# Patient Record
Sex: MALE | Race: BLACK OR AFRICAN AMERICAN | NOT HISPANIC OR LATINO | Employment: UNEMPLOYED | URBAN - METROPOLITAN AREA
[De-identification: names, ages, dates, MRNs, and addresses within clinical notes are randomized per-mention and may not be internally consistent; named-entity substitution may affect disease eponyms.]

---

## 2023-02-25 PROBLEM — R07.89 ATYPICAL CHEST PAIN: Status: ACTIVE | Noted: 2023-02-25

## 2023-02-25 PROBLEM — I50.9 CHF (CONGESTIVE HEART FAILURE) (MULTI): Status: ACTIVE | Noted: 2023-02-25

## 2023-02-25 PROBLEM — N13.8 BPH WITH OBSTRUCTION/LOWER URINARY TRACT SYMPTOMS: Status: ACTIVE | Noted: 2023-02-25

## 2023-02-25 PROBLEM — N40.1 BPH WITH OBSTRUCTION/LOWER URINARY TRACT SYMPTOMS: Status: ACTIVE | Noted: 2023-02-25

## 2023-02-25 PROBLEM — I51.89 LEFT VENTRICULAR SYSTOLIC DYSFUNCTION, NYHA CLASS 3: Status: ACTIVE | Noted: 2023-02-25

## 2023-02-25 PROBLEM — G89.29 CHRONIC RIGHT-SIDED LOW BACK PAIN WITH SCIATICA: Status: ACTIVE | Noted: 2023-02-25

## 2023-02-25 PROBLEM — E11.3293 TYPE 2 DIABETES MELLITUS WITH MILD NONPROLIFERATIVE DIABETIC RETINOPATHY WITHOUT MACULAR EDEMA, BILATERAL (MULTI): Status: ACTIVE | Noted: 2023-02-25

## 2023-02-25 PROBLEM — E55.9 VITAMIN D DEFICIENCY: Status: ACTIVE | Noted: 2023-02-25

## 2023-02-25 PROBLEM — H10.9 CONJUNCTIVITIS, LEFT EYE: Status: ACTIVE | Noted: 2023-02-25

## 2023-02-25 PROBLEM — D64.9 ANEMIA: Status: ACTIVE | Noted: 2023-02-25

## 2023-02-25 PROBLEM — M54.40 CHRONIC RIGHT-SIDED LOW BACK PAIN WITH SCIATICA: Status: ACTIVE | Noted: 2023-02-25

## 2023-02-25 PROBLEM — R06.00 DYSPNEA: Status: ACTIVE | Noted: 2023-02-25

## 2023-02-25 PROBLEM — H25.812 COMBINED FORM OF AGE-RELATED CATARACT, LEFT EYE: Status: ACTIVE | Noted: 2023-02-25

## 2023-02-25 PROBLEM — I20.9 ANGINA PECTORIS (CMS-HCC): Status: ACTIVE | Noted: 2023-02-25

## 2023-02-25 PROBLEM — I25.10 ARTERIOSCLEROTIC CARDIOVASCULAR DISEASE: Status: ACTIVE | Noted: 2023-02-25

## 2023-02-25 PROBLEM — E11.43 DIABETIC GASTROPARESIS (MULTI): Status: ACTIVE | Noted: 2023-02-25

## 2023-02-25 PROBLEM — R60.0 PEDAL EDEMA: Status: ACTIVE | Noted: 2023-02-25

## 2023-02-25 PROBLEM — K59.00 CONSTIPATION: Status: ACTIVE | Noted: 2023-02-25

## 2023-02-25 PROBLEM — J45.909 ASTHMA (HHS-HCC): Status: ACTIVE | Noted: 2023-02-25

## 2023-02-25 PROBLEM — R29.818 SUSPECTED SLEEP APNEA: Status: ACTIVE | Noted: 2023-02-25

## 2023-02-25 PROBLEM — N20.0 NEPHROLITHIASIS: Status: ACTIVE | Noted: 2023-02-25

## 2023-02-25 PROBLEM — R22.32 LOCALIZED SWELLING ON LEFT HAND: Status: ACTIVE | Noted: 2023-02-25

## 2023-02-25 PROBLEM — H40.003 GLAUCOMA SUSPECT OF BOTH EYES: Status: ACTIVE | Noted: 2023-02-25

## 2023-02-25 PROBLEM — R11.0 NAUSEA IN ADULT: Status: ACTIVE | Noted: 2023-02-25

## 2023-02-25 PROBLEM — E66.01 MORBID OBESITY (MULTI): Status: ACTIVE | Noted: 2023-02-25

## 2023-02-25 PROBLEM — E11.3299 BDR (BACKGROUND DIABETIC RETINOPATHY) (MULTI): Status: ACTIVE | Noted: 2023-02-25

## 2023-02-25 PROBLEM — H35.033 BILATERAL HYPERTENSIVE RETINOPATHY: Status: ACTIVE | Noted: 2023-02-25

## 2023-02-25 PROBLEM — N39.3 STRESS INCONTINENCE, MALE: Status: ACTIVE | Noted: 2023-02-25

## 2023-02-25 PROBLEM — M19.042 LOCALIZED OSTEOARTHRITIS OF LEFT HAND: Status: ACTIVE | Noted: 2023-02-25

## 2023-02-25 PROBLEM — K04.7 TOOTH INFECTION: Status: ACTIVE | Noted: 2023-02-25

## 2023-02-25 PROBLEM — H04.123 DRY EYES, BILATERAL: Status: ACTIVE | Noted: 2023-02-25

## 2023-02-25 PROBLEM — J30.9 ALLERGIC RHINITIS: Status: ACTIVE | Noted: 2023-02-25

## 2023-02-25 PROBLEM — E78.5 HYPERLIPIDEMIA: Status: ACTIVE | Noted: 2023-02-25

## 2023-02-25 PROBLEM — H35.383: Status: ACTIVE | Noted: 2023-02-25

## 2023-02-25 PROBLEM — I73.9 INTERMITTENT CLAUDICATION (CMS-HCC): Status: ACTIVE | Noted: 2023-02-25

## 2023-02-25 PROBLEM — K12.1 STOMATITIS: Status: ACTIVE | Noted: 2023-02-25

## 2023-02-25 PROBLEM — L30.9 DERMATITIS, ECZEMATOID: Status: ACTIVE | Noted: 2023-02-25

## 2023-02-25 PROBLEM — H25.811 COMBINED FORM OF AGE-RELATED CATARACT, RIGHT EYE: Status: ACTIVE | Noted: 2023-02-25

## 2023-02-25 PROBLEM — R05.9 COUGH: Status: ACTIVE | Noted: 2023-02-25

## 2023-02-25 PROBLEM — N39.46 URGE AND STRESS INCONTINENCE: Status: ACTIVE | Noted: 2023-02-25

## 2023-02-25 PROBLEM — H40.009 BORDERLINE GLAUCOMA (GLAUCOMA SUSPECT): Status: ACTIVE | Noted: 2023-02-25

## 2023-02-25 PROBLEM — I10 BENIGN ESSENTIAL HYPERTENSION: Status: ACTIVE | Noted: 2023-02-25

## 2023-02-25 PROBLEM — M65.942 TENOSYNOVITIS OF LEFT HAND: Status: ACTIVE | Noted: 2023-02-25

## 2023-02-25 PROBLEM — E83.51 HYPOCALCEMIA: Status: ACTIVE | Noted: 2023-02-25

## 2023-02-25 PROBLEM — R35.0 URINARY FREQUENCY: Status: ACTIVE | Noted: 2023-02-25

## 2023-02-25 PROBLEM — G62.9 NEUROPATHY: Status: ACTIVE | Noted: 2023-02-25

## 2023-02-25 PROBLEM — R94.5 ABNORMAL LIVER FUNCTION: Status: ACTIVE | Noted: 2023-02-25

## 2023-02-25 PROBLEM — K31.84 DIABETIC GASTROPARESIS (MULTI): Status: ACTIVE | Noted: 2023-02-25

## 2023-02-25 PROBLEM — M65.9 TENOSYNOVITIS OF LEFT HAND: Status: ACTIVE | Noted: 2023-02-25

## 2023-02-25 PROBLEM — M10.9 GOUT: Status: ACTIVE | Noted: 2023-02-25

## 2023-02-25 PROBLEM — M94.0 COSTOCHONDRITIS: Status: ACTIVE | Noted: 2023-02-25

## 2023-02-25 RX ORDER — ASPIRIN 81 MG/1
1 TABLET ORAL DAILY
COMMUNITY
Start: 2014-07-20 | End: 2024-04-11 | Stop reason: SDUPTHER

## 2023-02-25 RX ORDER — SYRINGE-NEEDLE,INSULIN,0.5 ML 27GX1/2"
SYRINGE, EMPTY DISPOSABLE MISCELLANEOUS
COMMUNITY
End: 2024-02-09 | Stop reason: SDUPTHER

## 2023-02-25 RX ORDER — PRAVASTATIN SODIUM 80 MG/1
1 TABLET ORAL DAILY
COMMUNITY
Start: 2020-01-09 | End: 2023-11-21 | Stop reason: SDUPTHER

## 2023-02-25 RX ORDER — BUMETANIDE 2 MG/1
1 TABLET ORAL 2 TIMES DAILY
COMMUNITY
Start: 2022-03-31 | End: 2023-05-10 | Stop reason: ENTERED-IN-ERROR

## 2023-02-25 RX ORDER — AMLODIPINE BESYLATE 10 MG/1
1 TABLET ORAL DAILY
COMMUNITY
Start: 2020-06-08 | End: 2023-11-21 | Stop reason: SDUPTHER

## 2023-02-25 RX ORDER — SERTRALINE HYDROCHLORIDE 25 MG/1
25 TABLET, FILM COATED ORAL DAILY
COMMUNITY
End: 2023-03-24 | Stop reason: SDUPTHER

## 2023-02-25 RX ORDER — INSULIN HUMAN 100 [IU]/ML
70 INJECTION, SUSPENSION SUBCUTANEOUS 2 TIMES DAILY
COMMUNITY
End: 2024-02-20 | Stop reason: SDUPTHER

## 2023-02-25 RX ORDER — POLYETHYLENE GLYCOL 3350 17 G/17G
16 POWDER, FOR SOLUTION ORAL DAILY
COMMUNITY
Start: 2019-07-18 | End: 2024-04-11 | Stop reason: SDUPTHER

## 2023-02-25 RX ORDER — DOCUSATE SODIUM 100 MG/1
1 CAPSULE, LIQUID FILLED ORAL 2 TIMES DAILY PRN
COMMUNITY
Start: 2020-01-10 | End: 2024-04-11 | Stop reason: SDUPTHER

## 2023-02-25 RX ORDER — TAMSULOSIN HYDROCHLORIDE 0.4 MG/1
2 CAPSULE ORAL
COMMUNITY
Start: 2020-10-05 | End: 2023-11-03 | Stop reason: SDUPTHER

## 2023-02-25 RX ORDER — ALBUTEROL SULFATE 0.83 MG/ML
2.5 SOLUTION RESPIRATORY (INHALATION) EVERY 6 HOURS PRN
COMMUNITY
Start: 2019-01-31

## 2023-02-25 RX ORDER — SYRINGE-NEEDLE,INSULIN,0.5 ML 30 G X1/2"
SYRINGE, EMPTY DISPOSABLE MISCELLANEOUS
COMMUNITY
End: 2024-02-16 | Stop reason: SDUPTHER

## 2023-02-25 RX ORDER — METOPROLOL SUCCINATE 50 MG/1
1 TABLET, EXTENDED RELEASE ORAL DAILY
COMMUNITY
Start: 2019-04-16 | End: 2023-11-21 | Stop reason: SDUPTHER

## 2023-02-25 RX ORDER — MONTELUKAST SODIUM 10 MG/1
1 TABLET ORAL DAILY
COMMUNITY
Start: 2019-03-06 | End: 2023-12-22 | Stop reason: SDUPTHER

## 2023-02-25 RX ORDER — METOCLOPRAMIDE 10 MG/1
1 TABLET ORAL 2 TIMES DAILY
COMMUNITY
End: 2023-07-20

## 2023-02-25 RX ORDER — NITROGLYCERIN 0.3 MG/1
1 TABLET SUBLINGUAL
COMMUNITY
Start: 2016-08-19

## 2023-02-25 RX ORDER — SACUBITRIL AND VALSARTAN 49; 51 MG/1; MG/1
1 TABLET, FILM COATED ORAL 2 TIMES DAILY
COMMUNITY
Start: 2018-03-29 | End: 2023-11-21 | Stop reason: SDUPTHER

## 2023-02-25 RX ORDER — ALBUTEROL SULFATE 90 UG/1
1-2 AEROSOL, METERED RESPIRATORY (INHALATION) AS NEEDED
COMMUNITY
Start: 2019-01-31 | End: 2024-03-08 | Stop reason: SDUPTHER

## 2023-02-25 RX ORDER — NAPHAZOLINE HYDROCHLORIDE AND POLYETHYLENE GLYCOL 300 .1; 2 MG/ML; MG/ML
SOLUTION/ DROPS OPHTHALMIC
COMMUNITY
Start: 2014-04-14 | End: 2024-02-20 | Stop reason: WASHOUT

## 2023-03-24 ENCOUNTER — OFFICE VISIT (OUTPATIENT)
Dept: PRIMARY CARE | Facility: CLINIC | Age: 63
End: 2023-03-24
Payer: COMMERCIAL

## 2023-03-24 VITALS
HEART RATE: 67 BPM | DIASTOLIC BLOOD PRESSURE: 56 MMHG | WEIGHT: 315 LBS | BODY MASS INDEX: 48.2 KG/M2 | SYSTOLIC BLOOD PRESSURE: 134 MMHG | OXYGEN SATURATION: 97 %

## 2023-03-24 DIAGNOSIS — I50.9 CONGESTIVE HEART FAILURE, UNSPECIFIED HF CHRONICITY, UNSPECIFIED HEART FAILURE TYPE (MULTI): ICD-10-CM

## 2023-03-24 DIAGNOSIS — G62.9 NEUROPATHY: ICD-10-CM

## 2023-03-24 DIAGNOSIS — F32.1 CURRENT MODERATE EPISODE OF MAJOR DEPRESSIVE DISORDER WITHOUT PRIOR EPISODE (MULTI): Primary | Chronic | ICD-10-CM

## 2023-03-24 DIAGNOSIS — N40.1 BPH WITH OBSTRUCTION/LOWER URINARY TRACT SYMPTOMS: ICD-10-CM

## 2023-03-24 DIAGNOSIS — I10 BENIGN ESSENTIAL HYPERTENSION: ICD-10-CM

## 2023-03-24 DIAGNOSIS — E11.43 DIABETIC GASTROPARESIS (MULTI): ICD-10-CM

## 2023-03-24 DIAGNOSIS — K31.84 DIABETIC GASTROPARESIS (MULTI): ICD-10-CM

## 2023-03-24 DIAGNOSIS — R29.818 SUSPECTED SLEEP APNEA: ICD-10-CM

## 2023-03-24 DIAGNOSIS — N13.8 BPH WITH OBSTRUCTION/LOWER URINARY TRACT SYMPTOMS: ICD-10-CM

## 2023-03-24 DIAGNOSIS — E11.3299 BDR (BACKGROUND DIABETIC RETINOPATHY) (MULTI): ICD-10-CM

## 2023-03-24 DIAGNOSIS — N52.8 OTHER MALE ERECTILE DYSFUNCTION: ICD-10-CM

## 2023-03-24 DIAGNOSIS — R94.5 ABNORMAL LIVER FUNCTION: ICD-10-CM

## 2023-03-24 DIAGNOSIS — Z13.89 SCREENING FOR OBESITY: ICD-10-CM

## 2023-03-24 PROCEDURE — 1036F TOBACCO NON-USER: CPT | Performed by: INTERNAL MEDICINE

## 2023-03-24 PROCEDURE — 3075F SYST BP GE 130 - 139MM HG: CPT | Performed by: INTERNAL MEDICINE

## 2023-03-24 PROCEDURE — 99214 OFFICE O/P EST MOD 30 MIN: CPT | Performed by: INTERNAL MEDICINE

## 2023-03-24 PROCEDURE — 3078F DIAST BP <80 MM HG: CPT | Performed by: INTERNAL MEDICINE

## 2023-03-24 PROCEDURE — 3008F BODY MASS INDEX DOCD: CPT | Performed by: INTERNAL MEDICINE

## 2023-03-24 RX ORDER — FUROSEMIDE 40 MG/1
1 TABLET ORAL DAILY
COMMUNITY
Start: 2019-08-30 | End: 2023-11-21 | Stop reason: SDUPTHER

## 2023-03-24 RX ORDER — TADALAFIL 10 MG/1
10 TABLET ORAL DAILY PRN
Qty: 4 TABLET | Refills: 11 | Status: SHIPPED | OUTPATIENT
Start: 2023-03-24 | End: 2023-03-30 | Stop reason: SDUPTHER

## 2023-03-24 RX ORDER — FLUTICASONE PROPIONATE 50 MCG
SPRAY, SUSPENSION (ML) NASAL
COMMUNITY
Start: 2022-02-28

## 2023-03-24 RX ORDER — SERTRALINE HYDROCHLORIDE 25 MG/1
25 TABLET, FILM COATED ORAL DAILY
Qty: 90 TABLET | Refills: 1 | Status: SHIPPED | OUTPATIENT
Start: 2023-03-24

## 2023-03-24 ASSESSMENT — PATIENT HEALTH QUESTIONNAIRE - PHQ9
1. LITTLE INTEREST OR PLEASURE IN DOING THINGS: SEVERAL DAYS
9. THOUGHTS THAT YOU WOULD BE BETTER OFF DEAD, OR OF HURTING YOURSELF: NOT AT ALL
2. FEELING DOWN, DEPRESSED OR HOPELESS: MORE THAN HALF THE DAYS
8. MOVING OR SPEAKING SO SLOWLY THAT OTHER PEOPLE COULD HAVE NOTICED. OR THE OPPOSITE, BEING SO FIGETY OR RESTLESS THAT YOU HAVE BEEN MOVING AROUND A LOT MORE THAN USUAL: SEVERAL DAYS
10. IF YOU CHECKED OFF ANY PROBLEMS, HOW DIFFICULT HAVE THESE PROBLEMS MADE IT FOR YOU TO DO YOUR WORK, TAKE CARE OF THINGS AT HOME, OR GET ALONG WITH OTHER PEOPLE: EXTREMELY DIFFICULT
6. FEELING BAD ABOUT YOURSELF - OR THAT YOU ARE A FAILURE OR HAVE LET YOURSELF OR YOUR FAMILY DOWN: MORE THAN HALF THE DAYS
7. TROUBLE CONCENTRATING ON THINGS, SUCH AS READING THE NEWSPAPER OR WATCHING TELEVISION: NEARLY EVERY DAY
4. FEELING TIRED OR HAVING LITTLE ENERGY: NEARLY EVERY DAY
SUM OF ALL RESPONSES TO PHQ9 QUESTIONS 1 AND 2: 3
3. TROUBLE FALLING OR STAYING ASLEEP OR SLEEPING TOO MUCH: NOT AT ALL

## 2023-03-24 ASSESSMENT — ENCOUNTER SYMPTOMS
DEPRESSION: 1
DEPRESSED MOOD: 1
ANHEDONIA: 1
SLEEP QUALITY: GOOD
NIGHTTIME AWAKENINGS: OCCASIONAL

## 2023-03-24 NOTE — PROGRESS NOTES
Patient ID: Dallas Ruiz is a 63 y.o. male who presents for Follow-up.    /56   Pulse 67   Wt (!) 170 kg (375 lb 6.4 oz)   SpO2 97%   BMI 48.20 kg/m²     Depression  Visit Type: follow-up  Patient presents with the following symptoms: anhedonia, depressed mood and fatigue.  Patient is not experiencing: suicidal ideas and suicidal planning.  Frequency of symptoms: most days   Severity: moderate   Hours of sleep per night: 6-7.  Sleep quality: good  Nighttime awakenings: occasional  Compliance with medications:  26-50%  Side effects:  Impotence      Subjective     Review of Systems   Psychiatric/Behavioral:  Positive for depression. Negative for suicidal ideas.        Objective     Physical Exam  Vitals and nursing note reviewed.   Constitutional:       Appearance: Normal appearance.   Cardiovascular:      Rate and Rhythm: Normal rate and regular rhythm.      Pulses: Normal pulses.      Heart sounds: Normal heart sounds. No murmur heard.  Pulmonary:      Effort: Pulmonary effort is normal.      Breath sounds: Normal breath sounds.   Neurological:      Mental Status: He is alert.   Psychiatric:         Behavior: Behavior normal.         Thought Content: Thought content normal.         Judgment: Judgment normal.      Comments: Depressed          Lab Results   Component Value Date    WBC 8.0 01/24/2023    HGB 10.9 (L) 01/24/2023    HCT 36.2 (L) 01/24/2023    MCV 73 (L) 01/24/2023     01/24/2023           Problem List Items Addressed This Visit          Nervous    Diabetic gastroparesis (CMS/[facility])    Neuropathy       Circulatory    Benign essential hypertension    CHF (congestive heart failure) (CMS/[facility])    Relevant Medications    tadalafil (Cialis) 10 mg tablet       Digestive    Abnormal liver function       Genitourinary    BPH with obstruction/lower urinary tract symptoms       Endocrine/Metabolic    BDR (background diabetic retinopathy) (CMS/[facility])    Body mass index 45.0-49.9, adult (CMS/[facility])        Other    Suspected sleep apnea    Screening for obesity     Other Visit Diagnoses       Current moderate episode of major depressive disorder without prior episode (CMS/HCC)  (Chronic)   -  Primary    Relevant Medications    sertraline (Zoloft) 25 mg tablet    Other male erectile dysfunction        Relevant Medications    tadalafil (Cialis) 10 mg tablet                He feels doing better with sertraline 25 mg I refilled it for 90 days and 1 refill we will see him in 3 months time  Since he is having erectile dysfunction I put Cialis 10 mg 1 pill as needed  Discussed the adverse effect effect of obesity on physical and mental wellbeing  Including sleep apnea daytime somnolence and fatigue a.m. headache  Risk for stroke fatal arrhythmias and sudden death I told him he needs to work on overweight  Needs to cut back portion size and total calorie intake

## 2023-03-29 ENCOUNTER — TELEPHONE (OUTPATIENT)
Dept: PRIMARY CARE | Facility: CLINIC | Age: 63
End: 2023-03-29
Payer: COMMERCIAL

## 2023-03-29 DIAGNOSIS — N52.8 OTHER MALE ERECTILE DYSFUNCTION: ICD-10-CM

## 2023-03-30 RX ORDER — TADALAFIL 10 MG/1
10 TABLET ORAL DAILY PRN
Qty: 90 TABLET | Refills: 1 | Status: SHIPPED | OUTPATIENT
Start: 2023-03-30 | End: 2024-03-08

## 2023-04-11 NOTE — TELEPHONE ENCOUNTER
[facility] pharmacy calling to inform you that pt. Is taking Tadalafil and Nitrostat and there could be a reaction by taking both of them. They have contacted Dr. Drew who said it is ok, they would also like an ok from you. They can be reached at [phone]

## 2023-04-14 ENCOUNTER — TELEPHONE (OUTPATIENT)
Dept: PRIMARY CARE | Facility: CLINIC | Age: 63
End: 2023-04-14
Payer: COMMERCIAL

## 2023-04-14 NOTE — TELEPHONE ENCOUNTER
Pt. Informed per Dr. Rivera that he has to make sure he spaces a span of 24 hours between taking Tadalafil and Nitrostat.

## 2023-04-27 ENCOUNTER — TELEPHONE (OUTPATIENT)
Dept: PRIMARY CARE | Facility: CLINIC | Age: 63
End: 2023-04-27
Payer: COMMERCIAL

## 2023-04-27 NOTE — TELEPHONE ENCOUNTER
Pt.  is trying to move to a new housing unit. He is requesting a letter from you that states he needs an extra room to store medical items he uses for his health issues. Pt.  has cases of depends, boxes of syringes, cases of chux, a machine that reduces swelling in his arms and legs, and a Tinex machine for electrical stimulation for back pain due to a prior back injury. He would like this faxed to [facility] section [address], attention Ms. Eboni Rodriguez. He will call back with fax number.

## 2023-05-10 ENCOUNTER — TELEMEDICINE (OUTPATIENT)
Dept: PRIMARY CARE | Facility: CLINIC | Age: 63
End: 2023-05-10
Payer: COMMERCIAL

## 2023-05-10 DIAGNOSIS — N39.3 STRESS INCONTINENCE, MALE: ICD-10-CM

## 2023-05-10 DIAGNOSIS — I25.10 ARTERIOSCLEROTIC CARDIOVASCULAR DISEASE: ICD-10-CM

## 2023-05-10 DIAGNOSIS — R29.818 SUSPECTED SLEEP APNEA: ICD-10-CM

## 2023-05-10 DIAGNOSIS — G62.9 NEUROPATHY: Primary | ICD-10-CM

## 2023-05-10 DIAGNOSIS — I51.89 LEFT VENTRICULAR SYSTOLIC DYSFUNCTION, NYHA CLASS 3: ICD-10-CM

## 2023-05-10 DIAGNOSIS — Z13.89 SCREENING FOR OBESITY: ICD-10-CM

## 2023-05-10 DIAGNOSIS — E11.3293 TYPE 2 DIABETES MELLITUS WITH BOTH EYES AFFECTED BY MILD NONPROLIFERATIVE RETINOPATHY WITHOUT MACULAR EDEMA, WITH LONG-TERM CURRENT USE OF INSULIN (MULTI): ICD-10-CM

## 2023-05-10 DIAGNOSIS — E11.3299 BDR (BACKGROUND DIABETIC RETINOPATHY) (MULTI): ICD-10-CM

## 2023-05-10 DIAGNOSIS — I10 BENIGN ESSENTIAL HYPERTENSION: ICD-10-CM

## 2023-05-10 DIAGNOSIS — E66.01 CLASS 3 SEVERE OBESITY DUE TO EXCESS CALORIES WITH SERIOUS COMORBIDITY AND BODY MASS INDEX (BMI) OF 45.0 TO 49.9 IN ADULT (MULTI): ICD-10-CM

## 2023-05-10 DIAGNOSIS — J45.20 MILD INTERMITTENT ASTHMA, UNSPECIFIED WHETHER COMPLICATED (HHS-HCC): ICD-10-CM

## 2023-05-10 DIAGNOSIS — I20.9 ANGINA PECTORIS (CMS-HCC): ICD-10-CM

## 2023-05-10 DIAGNOSIS — I50.9 CONGESTIVE HEART FAILURE, UNSPECIFIED HF CHRONICITY, UNSPECIFIED HEART FAILURE TYPE (MULTI): ICD-10-CM

## 2023-05-10 DIAGNOSIS — Z79.4 TYPE 2 DIABETES MELLITUS WITH BOTH EYES AFFECTED BY MILD NONPROLIFERATIVE RETINOPATHY WITHOUT MACULAR EDEMA, WITH LONG-TERM CURRENT USE OF INSULIN (MULTI): ICD-10-CM

## 2023-05-10 PROBLEM — E66.813 CLASS 3 SEVERE OBESITY DUE TO EXCESS CALORIES WITH SERIOUS COMORBIDITY AND BODY MASS INDEX (BMI) OF 45.0 TO 49.9 IN ADULT: Status: ACTIVE | Noted: 2023-05-10

## 2023-05-10 PROCEDURE — 99417 PROLNG OP E/M EACH 15 MIN: CPT | Performed by: INTERNAL MEDICINE

## 2023-05-10 PROCEDURE — 99215 OFFICE O/P EST HI 40 MIN: CPT | Performed by: INTERNAL MEDICINE

## 2023-05-10 ASSESSMENT — ENCOUNTER SYMPTOMS
VISUAL CHANGE: 1
BLURRED VISION: 0
NERVOUS/ANXIOUS: 1
FATIGUE: 1
WEAKNESS: 0

## 2023-05-10 NOTE — PROGRESS NOTES
Subjective   Patient ID: Dallas Davies is a 63 y.o. male who presents for No chief complaint on file..    Diabetes  He presents for his follow-up diabetic visit. He has type 2 diabetes mellitus. MedicAlert identification noted. Onset time: many years. Hypoglycemia symptoms include nervousness/anxiousness. Associated symptoms include chest pain, fatigue, foot paresthesias and visual change. Pertinent negatives for diabetes include no blurred vision, no foot ulcerations and no weakness. Symptoms are worsening. Risk factors for coronary artery disease include diabetes mellitus, male sex and obesity. Current diabetic treatment includes insulin injections and oral agent (dual therapy). He is compliant with treatment most of the time. He is following a diabetic diet. When asked about meal planning, he reported none. He has not had a previous visit with a dietitian. He participates in exercise intermittently. An ACE inhibitor/angiotensin II receptor blocker is being taken. He does not see a podiatrist.Eye exam is current.          Telephone call to the patient, which is a telehealth visit  To fill out form, for changed accommodation  He has multiple medical problems  High blood pressure  Diabetes   Suspected sleep apnea  Diabetes with complications which includes neuropathy retinopathy  Coronary artery disease  Congestive heart failure  Hypertension  Severe depression  Diminished vision in both eyes        Review of Systems:  Constitutional: No fever or chills  Cardiovascular: no chest pain, no palpitations and no syncope.   Respiratory: no cough, no shortness of breath during exertion and no shortness of breath at rest.   Gastrointestinal: no abdominal pain, no nausea and no vomiting.  Neuro: No Headache, no dizziness    Physical Exam:   Constitutional: Alert and in no acute distress. Well developed, well nourished.   Head and Face: Head and face: Normal.     Eyes: Normal external exam.    Ears, Nose, Mouth, and Throat:  External inspection of ears and nose: Normal.  Hearing: Normal.   Neck: No neck mass was observed. Supple.  Pulmonary: No respiratory distress.    Musculoskeletal: Range of motion: Normal.     Skin: Normal skin color and pigmentation, normal skin turgor, and no rash.    Neurologic: Coordination: Normal.    Psychiatric: Judgment and insight: Intact. Mood and affect: Normal.    Lab Results   Component Value Date    WBC 8.0 01/24/2023    HGB 10.9 (L) 01/24/2023    HCT 36.2 (L) 01/24/2023     01/24/2023    CHOL 109 03/31/2022    TRIG 110 01/28/2022    HDL 31.0 (A) 03/31/2022    LDLDIRECT 66 03/31/2022    ALT 17 12/20/2021    AST 18 12/20/2021     03/31/2022    K 4.0 03/31/2022     03/31/2022    CREATININE 1.25 03/31/2022    BUN 10 03/31/2022    CO2 25 03/31/2022    TSH 1.55 12/20/2021    PSA 0.66 01/28/2022    INR 1.1 12/19/2021    HGBA1C 7.9 (A) 03/31/2022       Electrocardiogram 12 Lead  Normal sinus rhythm  Normal ECG  When compared with ECG of 19-DEC-2009 07:59,  No significant change was found  Confirmed by RANDALL HAYNES MD ([id number]) on 1/8/2014 11:47:26 PM  Electrocardiogram 12 Lead  Sinus bradycardia  Septal infarct , age undetermined  Abnormal ECG  When compared with ECG of 15-MAR-2013 18:06,  Septal infarct is now Present  Confirmed by José Ferrer MD ([id number]) on 4/13/2013 1:36:17 PM  Electrocardiogram 12 Lead  Normal sinus rhythm  Normal ECG  When compared with ECG of 16-MAR-2013 06:14,  Criteria for Septal infarct are no longer Present  ST elevation now present in Inferior leads  Confirmed by RANDALL HAYNES MD ([id number]) on 4/30/2013 11:58:07 PM      Assessment/Plan   Diagnoses and all orders for this visit:  Neuropathy  Mild intermittent asthma, unspecified whether complicated  Angina pectoris (CMS/HCC)  Benign essential hypertension  Arteriosclerotic cardiovascular disease  Congestive heart failure, unspecified HF chronicity, unspecified heart failure type (CMS/HCC)  Left ventricular systolic  dysfunction, NYHA class 3  Stress incontinence, male  BDR (background diabetic retinopathy) (CMS/[facility])  Type 2 diabetes mellitus with both eyes affected by mild nonproliferative retinopathy without macular edema, with long-term current use of insulin (CMS/[facility])  Suspected sleep apnea  Screening for obesity  Class 3 severe obesity due to excess calories with serious comorbidity and body mass index (BMI) of 45.0 to 49.9 in adult (CMS/[facility])    1.       Since he has significant physical morbidity from multiple medical illness, and has severe depression with morbid obesity  I filled out his residential accommodation form for new location  Considering his multiple medical illness      This Medical recommendation has been made based on the Telephonic/Video conversation and the history is given by the patient, which I as a Physician and the patient also understand that there are limitations given the lack of an in-person Physical Exam. Patient will call back if symptoms don't improve.    Your yearly Physical is due in:   When you call the office for your yearly Physical, please ask them to inform me to order your blood work, so that you can get the fasting blood work before your appointment and we can discuss the results at your physical.      A Doctor is always available by phone when the office is closed. Please feel free to call for help with any problem that you feel shouldn't wait until the office re-opens.     Dilcia Rivera MD

## 2023-06-27 ENCOUNTER — APPOINTMENT (OUTPATIENT)
Dept: PRIMARY CARE | Facility: CLINIC | Age: 63
End: 2023-06-27
Payer: COMMERCIAL

## 2023-06-27 ENCOUNTER — TELEPHONE (OUTPATIENT)
Dept: PRIMARY CARE | Facility: CLINIC | Age: 63
End: 2023-06-27

## 2023-06-27 DIAGNOSIS — Z79.4 TYPE 2 DIABETES MELLITUS WITH BOTH EYES AFFECTED BY MILD NONPROLIFERATIVE RETINOPATHY WITHOUT MACULAR EDEMA, WITH LONG-TERM CURRENT USE OF INSULIN (MULTI): ICD-10-CM

## 2023-06-27 DIAGNOSIS — E11.3293 TYPE 2 DIABETES MELLITUS WITH BOTH EYES AFFECTED BY MILD NONPROLIFERATIVE RETINOPATHY WITHOUT MACULAR EDEMA, WITH LONG-TERM CURRENT USE OF INSULIN (MULTI): ICD-10-CM

## 2023-06-27 NOTE — TELEPHONE ENCOUNTER
Spoke with patient and advised of message   Pt. States actually does not need a new machine, states his strips have . He needs rx for new strips for his Prodigy glucometer. Would like sent to mail order on file.

## 2023-06-27 NOTE — TELEPHONE ENCOUNTER
Pt. States his ride did not come today and so had to miss his appt. He has rescheduled for Thursday. He is worried about what his blood sugar reading is because his meter broke. Can you please send new one? Would like sent to mail order on file. Also has been having headaches for the past 5 days, asking what he can take for this.

## 2023-06-29 ENCOUNTER — APPOINTMENT (OUTPATIENT)
Dept: PRIMARY CARE | Facility: CLINIC | Age: 63
End: 2023-06-29
Payer: COMMERCIAL

## 2023-06-29 RX ORDER — BLOOD SUGAR DIAGNOSTIC
100 STRIP MISCELLANEOUS 3 TIMES DAILY
Qty: 300 STRIP | Refills: 2 | Status: SHIPPED | OUTPATIENT
Start: 2023-06-29 | End: 2023-09-27

## 2023-07-07 ENCOUNTER — TELEPHONE (OUTPATIENT)
Dept: PRIMARY CARE | Facility: CLINIC | Age: 63
End: 2023-07-07
Payer: COMMERCIAL

## 2023-07-07 DIAGNOSIS — Z79.4 TYPE 2 DIABETES MELLITUS WITH OTHER SPECIFIED COMPLICATION, WITH LONG-TERM CURRENT USE OF INSULIN (MULTI): Primary | ICD-10-CM

## 2023-07-07 DIAGNOSIS — E11.69 TYPE 2 DIABETES MELLITUS WITH OTHER SPECIFIED COMPLICATION, WITH LONG-TERM CURRENT USE OF INSULIN (MULTI): Primary | ICD-10-CM

## 2023-07-07 RX ORDER — DEXTROSE 4 G
TABLET,CHEWABLE ORAL
Qty: 1 EACH | Refills: 0 | Status: SHIPPED | OUTPATIENT
Start: 2023-07-07 | End: 2024-07-06

## 2023-07-07 NOTE — TELEPHONE ENCOUNTER
Pt. States aware that his Prodigy strips are not covered by insurance. Spoke with his insurance. They will cover a glucometer and strips, either One Touch Ultra Blue or True Metrics. Can you please send glucometer and strips to [facility] pharmacy on file?

## 2023-07-11 ENCOUNTER — TELEPHONE (OUTPATIENT)
Dept: PRIMARY CARE | Facility: CLINIC | Age: 63
End: 2023-07-11
Payer: COMMERCIAL

## 2023-07-11 NOTE — TELEPHONE ENCOUNTER
Randi from [facility] pharmacy is requesting an ok for the pharmacy to dispense any glucometer and strip set that is covered by his insurance. Also asking for specific directions of how often pt. Is to use his strips. It was written 2-3 times a day and 3 times a day. Asking for you to be specific. Their phone number is [phone].

## 2023-07-20 ENCOUNTER — OFFICE VISIT (OUTPATIENT)
Dept: PRIMARY CARE | Facility: CLINIC | Age: 63
End: 2023-07-20
Payer: COMMERCIAL

## 2023-07-20 VITALS
BODY MASS INDEX: 50.37 KG/M2 | WEIGHT: 315 LBS | SYSTOLIC BLOOD PRESSURE: 120 MMHG | OXYGEN SATURATION: 98 % | DIASTOLIC BLOOD PRESSURE: 66 MMHG | HEART RATE: 69 BPM

## 2023-07-20 DIAGNOSIS — I51.89 LEFT VENTRICULAR SYSTOLIC DYSFUNCTION, NYHA CLASS 3: ICD-10-CM

## 2023-07-20 DIAGNOSIS — Z79.4 TYPE 2 DIABETES MELLITUS WITH BOTH EYES AFFECTED BY MILD NONPROLIFERATIVE RETINOPATHY WITHOUT MACULAR EDEMA, WITH LONG-TERM CURRENT USE OF INSULIN (MULTI): ICD-10-CM

## 2023-07-20 DIAGNOSIS — F32.A DEPRESSION, UNSPECIFIED DEPRESSION TYPE: Primary | Chronic | ICD-10-CM

## 2023-07-20 DIAGNOSIS — E11.3299 BDR (BACKGROUND DIABETIC RETINOPATHY) (MULTI): ICD-10-CM

## 2023-07-20 DIAGNOSIS — I73.9 INTERMITTENT CLAUDICATION (CMS-HCC): ICD-10-CM

## 2023-07-20 DIAGNOSIS — I25.10 ARTERIOSCLEROTIC CARDIOVASCULAR DISEASE: ICD-10-CM

## 2023-07-20 DIAGNOSIS — E66.01 CLASS 3 SEVERE OBESITY DUE TO EXCESS CALORIES WITH SERIOUS COMORBIDITY AND BODY MASS INDEX (BMI) OF 50.0 TO 59.9 IN ADULT (MULTI): ICD-10-CM

## 2023-07-20 DIAGNOSIS — Z13.89 SCREENING FOR OBESITY: ICD-10-CM

## 2023-07-20 DIAGNOSIS — G44.86 CERVICOGENIC HEADACHE: ICD-10-CM

## 2023-07-20 DIAGNOSIS — I10 BENIGN ESSENTIAL HYPERTENSION: ICD-10-CM

## 2023-07-20 DIAGNOSIS — E11.3293 TYPE 2 DIABETES MELLITUS WITH BOTH EYES AFFECTED BY MILD NONPROLIFERATIVE RETINOPATHY WITHOUT MACULAR EDEMA, WITH LONG-TERM CURRENT USE OF INSULIN (MULTI): ICD-10-CM

## 2023-07-20 DIAGNOSIS — I50.9 CONGESTIVE HEART FAILURE, UNSPECIFIED HF CHRONICITY, UNSPECIFIED HEART FAILURE TYPE (MULTI): ICD-10-CM

## 2023-07-20 PROBLEM — E66.813 CLASS 3 SEVERE OBESITY DUE TO EXCESS CALORIES WITH SERIOUS COMORBIDITY AND BODY MASS INDEX (BMI) OF 50.0 TO 59.9 IN ADULT: Status: ACTIVE | Noted: 2023-07-20

## 2023-07-20 PROCEDURE — 3074F SYST BP LT 130 MM HG: CPT | Performed by: INTERNAL MEDICINE

## 2023-07-20 PROCEDURE — 99215 OFFICE O/P EST HI 40 MIN: CPT | Performed by: INTERNAL MEDICINE

## 2023-07-20 PROCEDURE — 1036F TOBACCO NON-USER: CPT | Performed by: INTERNAL MEDICINE

## 2023-07-20 PROCEDURE — 3008F BODY MASS INDEX DOCD: CPT | Performed by: INTERNAL MEDICINE

## 2023-07-20 PROCEDURE — 3078F DIAST BP <80 MM HG: CPT | Performed by: INTERNAL MEDICINE

## 2023-07-20 ASSESSMENT — ENCOUNTER SYMPTOMS
MIGRAINE HEADACHES: 1
NUMBNESS: 0
HEADACHES: 1
SCALP TENDERNESS: 0
NECK PAIN: 1
CONSTITUTIONAL NEGATIVE: 1

## 2023-07-20 ASSESSMENT — PATIENT HEALTH QUESTIONNAIRE - PHQ9
9. THOUGHTS THAT YOU WOULD BE BETTER OFF DEAD, OR OF HURTING YOURSELF: NOT AT ALL
8. MOVING OR SPEAKING SO SLOWLY THAT OTHER PEOPLE COULD HAVE NOTICED. OR THE OPPOSITE, BEING SO FIGETY OR RESTLESS THAT YOU HAVE BEEN MOVING AROUND A LOT MORE THAN USUAL: NOT AT ALL
SUM OF ALL RESPONSES TO PHQ QUESTIONS 1-9: 17
SUM OF ALL RESPONSES TO PHQ9 QUESTIONS 1 AND 2: 6
3. TROUBLE FALLING OR STAYING ASLEEP OR SLEEPING TOO MUCH: MORE THAN HALF THE DAYS
2. FEELING DOWN, DEPRESSED OR HOPELESS: NEARLY EVERY DAY
5. POOR APPETITE OR OVEREATING: NEARLY EVERY DAY
8. MOVING OR SPEAKING SO SLOWLY THAT OTHER PEOPLE COULD HAVE NOTICED. OR THE OPPOSITE, BEING SO FIGETY OR RESTLESS THAT YOU HAVE BEEN MOVING AROUND A LOT MORE THAN USUAL: NOT AT ALL
9. THOUGHTS THAT YOU WOULD BE BETTER OFF DEAD, OR OF HURTING YOURSELF: NOT AT ALL
2. FEELING DOWN, DEPRESSED OR HOPELESS: NEARLY EVERY DAY
4. FEELING TIRED OR HAVING LITTLE ENERGY: NEARLY EVERY DAY
4. FEELING TIRED OR HAVING LITTLE ENERGY: NEARLY EVERY DAY
6. FEELING BAD ABOUT YOURSELF - OR THAT YOU ARE A FAILURE OR HAVE LET YOURSELF OR YOUR FAMILY DOWN: NEARLY EVERY DAY
2. FEELING DOWN, DEPRESSED OR HOPELESS: NOT AT ALL
SUM OF ALL RESPONSES TO PHQ QUESTIONS 1-9: 18
10. IF YOU CHECKED OFF ANY PROBLEMS, HOW DIFFICULT HAVE THESE PROBLEMS MADE IT FOR YOU TO DO YOUR WORK, TAKE CARE OF THINGS AT HOME, OR GET ALONG WITH OTHER PEOPLE: VERY DIFFICULT
1. LITTLE INTEREST OR PLEASURE IN DOING THINGS: MORE THAN HALF THE DAYS
3. TROUBLE FALLING OR STAYING ASLEEP OR SLEEPING TOO MUCH: SEVERAL DAYS
1. LITTLE INTEREST OR PLEASURE IN DOING THINGS: NOT AT ALL
1. LITTLE INTEREST OR PLEASURE IN DOING THINGS: NEARLY EVERY DAY
7. TROUBLE CONCENTRATING ON THINGS, SUCH AS READING THE NEWSPAPER OR WATCHING TELEVISION: NEARLY EVERY DAY
10. IF YOU CHECKED OFF ANY PROBLEMS, HOW DIFFICULT HAVE THESE PROBLEMS MADE IT FOR YOU TO DO YOUR WORK, TAKE CARE OF THINGS AT HOME, OR GET ALONG WITH OTHER PEOPLE: SOMEWHAT DIFFICULT
SUM OF ALL RESPONSES TO PHQ9 QUESTIONS 1 AND 2: 5
6. FEELING BAD ABOUT YOURSELF - OR THAT YOU ARE A FAILURE OR HAVE LET YOURSELF OR YOUR FAMILY DOWN: MORE THAN HALF THE DAYS
5. POOR APPETITE OR OVEREATING: MORE THAN HALF THE DAYS
SUM OF ALL RESPONSES TO PHQ9 QUESTIONS 1 AND 2: 0
7. TROUBLE CONCENTRATING ON THINGS, SUCH AS READING THE NEWSPAPER OR WATCHING TELEVISION: MORE THAN HALF THE DAYS

## 2023-07-20 NOTE — PROGRESS NOTES
Patient ID: Dallas Ruiz Jr. is a 63 y.o. male who presents for Headache (Tylenol not helping).    /66   Pulse 69   Wt (!) 172 kg (379 lb 3.2 oz)   SpO2 98%   BMI 50.37 kg/m²     Headache   This is a chronic problem. The current episode started more than 1 month ago. The problem occurs intermittently. The pain is located in the Left unilateral region. The pain quality is similar to prior headaches. The quality of the pain is described as aching. The pain is at a severity of 10/10. Associated symptoms include neck pain. Pertinent negatives include no numbness or scalp tenderness. He has tried acetaminophen for the symptoms. The treatment provided no relief. His past medical history is significant for hypertension, migraine headaches and obesity.       PT DENIES SLEEP APNEA   NO SNORING   NO GASPING OR CHOKING FOR AIR AT NIGHT   I DO NOT TAKE DAY TIME NAP  I OCCASIONALLY FEEL AM DROWSINESS OR SLEEPING           Subjective     Review of Systems   Constitutional: Negative.    Musculoskeletal:  Positive for neck pain.   Neurological:  Positive for headaches. Negative for numbness.   All other systems reviewed and are negative.      Objective     Physical Exam  Vitals and nursing note reviewed.   Constitutional:       Appearance: He is obese.   Skin:     Capillary Refill: Capillary refill takes more than 3 seconds.   Neurological:      General: No focal deficit present.      Mental Status: He is oriented to person, place, and time. Mental status is at baseline.      Gait: Gait normal.   Psychiatric:      Comments: PT CRYING , VERY SAD , VERY FRUSTRATED          Lab Results   Component Value Date    WBC 8.0 01/24/2023    HGB 10.9 (L) 01/24/2023    HCT 36.2 (L) 01/24/2023    MCV 73 (L) 01/24/2023     01/24/2023           Problem List Items Addressed This Visit       BDR (background diabetic retinopathy) (CMS/[facility])    Benign essential hypertension    Arteriosclerotic cardiovascular disease    CHF (congestive  heart failure) (CMS/[facility])    Intermittent claudication (CMS/[facility])    Left ventricular systolic dysfunction, NYHA class 3    Type 2 diabetes mellitus with mild nonproliferative diabetic retinopathy without macular edema, bilateral (CMS/[facility])    Screening for obesity    Class 3 severe obesity due to excess calories with serious comorbidity and body mass index (BMI) of 50.0 to 59.9 in adult (CMS/[facility])    Cervicogenic headache    Relevant Orders    XR cervical spine complete 4-5 views     Other Visit Diagnoses       Depression, unspecified depression type  (Chronic)   -  Primary    Relevant Orders    Referral to [facility]                A/P         X-RAY CERVICAL  SPINE   [facility]   OFFERED SLEEP MEDICINE REFFERAL PT DEFFERED   Cussed with the patient the effect of morbid obesity and overall all cause mortality  Discussed with the patient the effect of morbid obesity on physical and mental wellbeing  Discussed with the patient the effect of morbid obesity and cardiovascular and cerebrovascular health  Discussed the effect of morbid obesity on hypertension, DIABETES, sleep apnea fatal arrhythmias and sudden death  Discussed the effect of morbid obesity on chronic low back pain hip pain knee pain  Discussed the effect of morbid obesity on cancer/malignant  I told him he needs to talk to the behavioral health and need to address depression and then I can follow-up from there and he needs to get that neck x-ray my feeling is he is neck pain has to do probably from arthritis of the neck and also he has significant insomnia which could be contributing to the headache too I spent more than 50 minutes with the patient discussing his overall health issues and the state of his mind and and and the effect of depression on overall cause mortality and morbidity also addressed his social economic condition and other social determinants

## 2023-09-07 ENCOUNTER — TELEPHONE (OUTPATIENT)
Dept: PRIMARY CARE | Facility: CLINIC | Age: 63
End: 2023-09-07
Payer: COMMERCIAL

## 2023-09-07 NOTE — TELEPHONE ENCOUNTER
Pt. Requesting letter to be excused from jury duty. States he cannot sit down there all day. Jury duty is schd. For 9/29/23. Please include Juror # which is [id number]. This can be faxed to [phone].

## 2023-09-20 ENCOUNTER — APPOINTMENT (OUTPATIENT)
Dept: PRIMARY CARE | Facility: CLINIC | Age: 63
End: 2023-09-20
Payer: COMMERCIAL

## 2023-10-31 DIAGNOSIS — Z79.4 TYPE 2 DIABETES MELLITUS WITH OTHER SPECIFIED COMPLICATION, WITH LONG-TERM CURRENT USE OF INSULIN (MULTI): Primary | ICD-10-CM

## 2023-10-31 DIAGNOSIS — E11.69 TYPE 2 DIABETES MELLITUS WITH OTHER SPECIFIED COMPLICATION, WITH LONG-TERM CURRENT USE OF INSULIN (MULTI): Primary | ICD-10-CM

## 2023-11-03 DIAGNOSIS — N40.0 BENIGN PROSTATIC HYPERPLASIA, UNSPECIFIED WHETHER LOWER URINARY TRACT SYMPTOMS PRESENT: Primary | ICD-10-CM

## 2023-11-03 RX ORDER — TAMSULOSIN HYDROCHLORIDE 0.4 MG/1
0.8 CAPSULE ORAL DAILY
Qty: 180 CAPSULE | Refills: 3 | Status: SHIPPED | OUTPATIENT
Start: 2023-11-03 | End: 2024-04-30 | Stop reason: SDUPTHER

## 2023-11-21 DIAGNOSIS — R07.89 ATYPICAL CHEST PAIN: Primary | ICD-10-CM

## 2023-11-21 DIAGNOSIS — D50.9 IRON DEFICIENCY ANEMIA, UNSPECIFIED IRON DEFICIENCY ANEMIA TYPE: ICD-10-CM

## 2023-11-21 DIAGNOSIS — I50.22 CHRONIC SYSTOLIC CONGESTIVE HEART FAILURE (MULTI): ICD-10-CM

## 2023-11-21 DIAGNOSIS — I10 BENIGN ESSENTIAL HYPERTENSION: ICD-10-CM

## 2023-11-21 DIAGNOSIS — E78.2 MIXED HYPERLIPIDEMIA: ICD-10-CM

## 2023-11-21 RX ORDER — AMLODIPINE BESYLATE 10 MG/1
10 TABLET ORAL DAILY
Qty: 30 TABLET | Refills: 2 | Status: SHIPPED
Start: 2023-11-21 | End: 2024-02-20 | Stop reason: SDUPTHER

## 2023-11-21 RX ORDER — PRAVASTATIN SODIUM 80 MG/1
80 TABLET ORAL DAILY
Qty: 30 TABLET | Refills: 2 | Status: SHIPPED | OUTPATIENT
Start: 2023-11-21 | End: 2024-02-09 | Stop reason: SDUPTHER

## 2023-11-21 RX ORDER — SACUBITRIL AND VALSARTAN 49; 51 MG/1; MG/1
1 TABLET, FILM COATED ORAL 2 TIMES DAILY
Qty: 60 TABLET | Refills: 2 | Status: SHIPPED
Start: 2023-11-21 | End: 2024-02-20 | Stop reason: SDUPTHER

## 2023-11-21 RX ORDER — FUROSEMIDE 40 MG/1
40 TABLET ORAL DAILY
Qty: 30 TABLET | Refills: 2 | Status: SHIPPED
Start: 2023-11-21 | End: 2024-02-20 | Stop reason: SDUPTHER

## 2023-11-21 RX ORDER — METOPROLOL SUCCINATE 50 MG/1
50 TABLET, EXTENDED RELEASE ORAL DAILY
Qty: 30 TABLET | Refills: 2 | Status: SHIPPED
Start: 2023-11-21 | End: 2024-02-20 | Stop reason: SDUPTHER

## 2023-11-21 NOTE — TELEPHONE ENCOUNTER
Patient with last encounter 02/2023 as a Telehealth Encounter.   -- Medications refilled X 90 days -- NO ADDITIONAL REFILLS UNLESS SEEN IN PERSON  -- Needs IN PERSON Follow up within the next 90 days   -- Labs needed     Reviewed and approved by DA JORDAN on 11/21/23 at 11:48 AM.

## 2023-11-21 NOTE — TELEPHONE ENCOUNTER
Spoke to patient and informed of below. Patient reports that he was moved and all his clothes and shoes were thrown away, so he was unable to get out. Informed pt that I would have  call and schedule appt. Also instructed pt to have labs nick prior to appt.     Pt verbalizes understanding and agrees with plan.    Phoebe-can you please schedule follow-up.      Thanks

## 2023-12-22 DIAGNOSIS — J30.9 ALLERGIC RHINITIS, UNSPECIFIED SEASONALITY, UNSPECIFIED TRIGGER: ICD-10-CM

## 2023-12-25 RX ORDER — MONTELUKAST SODIUM 10 MG/1
10 TABLET ORAL DAILY
Qty: 90 TABLET | Refills: 1 | Status: SHIPPED
Start: 2023-12-25 | End: 2024-03-08 | Stop reason: SDUPTHER

## 2024-02-09 DIAGNOSIS — E11.3293 TYPE 2 DIABETES MELLITUS WITH BOTH EYES AFFECTED BY MILD NONPROLIFERATIVE RETINOPATHY WITHOUT MACULAR EDEMA, WITH LONG-TERM CURRENT USE OF INSULIN (MULTI): ICD-10-CM

## 2024-02-09 DIAGNOSIS — Z79.4 TYPE 2 DIABETES MELLITUS WITH BOTH EYES AFFECTED BY MILD NONPROLIFERATIVE RETINOPATHY WITHOUT MACULAR EDEMA, WITH LONG-TERM CURRENT USE OF INSULIN (MULTI): ICD-10-CM

## 2024-02-09 DIAGNOSIS — E78.2 MIXED HYPERLIPIDEMIA: ICD-10-CM

## 2024-02-11 RX ORDER — PRAVASTATIN SODIUM 80 MG/1
80 TABLET ORAL DAILY
Qty: 90 TABLET | Refills: 0 | Status: SHIPPED
Start: 2024-02-11 | End: 2024-02-20 | Stop reason: SDUPTHER

## 2024-02-11 RX ORDER — SYRINGE-NEEDLE,INSULIN,0.5 ML 27GX1/2"
SYRINGE, EMPTY DISPOSABLE MISCELLANEOUS
Qty: 200 EACH | Refills: 0 | Status: SHIPPED | OUTPATIENT
Start: 2024-02-11

## 2024-02-16 DIAGNOSIS — E11.3293 TYPE 2 DIABETES MELLITUS WITH BOTH EYES AFFECTED BY MILD NONPROLIFERATIVE RETINOPATHY WITHOUT MACULAR EDEMA, WITH LONG-TERM CURRENT USE OF INSULIN (MULTI): ICD-10-CM

## 2024-02-16 DIAGNOSIS — Z79.4 TYPE 2 DIABETES MELLITUS WITH BOTH EYES AFFECTED BY MILD NONPROLIFERATIVE RETINOPATHY WITHOUT MACULAR EDEMA, WITH LONG-TERM CURRENT USE OF INSULIN (MULTI): ICD-10-CM

## 2024-02-16 RX ORDER — SYRINGE-NEEDLE,INSULIN,0.5 ML 30 G X1/2"
SYRINGE, EMPTY DISPOSABLE MISCELLANEOUS
Qty: 200 EACH | Refills: 0 | Status: SHIPPED | OUTPATIENT
Start: 2024-02-16 | End: 2024-02-20 | Stop reason: WASHOUT

## 2024-02-20 ENCOUNTER — OFFICE VISIT (OUTPATIENT)
Dept: CARDIOLOGY | Facility: CLINIC | Age: 64
End: 2024-02-20
Payer: COMMERCIAL

## 2024-02-20 VITALS
DIASTOLIC BLOOD PRESSURE: 79 MMHG | WEIGHT: 315 LBS | OXYGEN SATURATION: 97 % | HEART RATE: 95 BPM | BODY MASS INDEX: 40.43 KG/M2 | HEIGHT: 74 IN | SYSTOLIC BLOOD PRESSURE: 112 MMHG

## 2024-02-20 DIAGNOSIS — R07.89 ATYPICAL CHEST PAIN: ICD-10-CM

## 2024-02-20 DIAGNOSIS — I10 BENIGN ESSENTIAL HYPERTENSION: ICD-10-CM

## 2024-02-20 DIAGNOSIS — E78.2 MIXED HYPERLIPIDEMIA: ICD-10-CM

## 2024-02-20 DIAGNOSIS — I50.22 CHRONIC SYSTOLIC CONGESTIVE HEART FAILURE (MULTI): ICD-10-CM

## 2024-02-20 DIAGNOSIS — I51.89 LEFT VENTRICULAR SYSTOLIC DYSFUNCTION, NYHA CLASS 3: ICD-10-CM

## 2024-02-20 DIAGNOSIS — I25.10 ARTERIOSCLEROTIC CARDIOVASCULAR DISEASE: Primary | ICD-10-CM

## 2024-02-20 LAB
ATRIAL RATE: 95 BPM
P AXIS: 31 DEGREES
P OFFSET: 199 MS
P ONSET: 138 MS
PR INTERVAL: 160 MS
Q ONSET: 218 MS
QRS COUNT: 15 BEATS
QRS DURATION: 78 MS
QT INTERVAL: 346 MS
QTC CALCULATION(BAZETT): 434 MS
QTC FREDERICIA: 403 MS
R AXIS: -17 DEGREES
T AXIS: 52 DEGREES
T OFFSET: 391 MS
VENTRICULAR RATE: 95 BPM

## 2024-02-20 PROCEDURE — 3008F BODY MASS INDEX DOCD: CPT | Performed by: INTERNAL MEDICINE

## 2024-02-20 PROCEDURE — 3074F SYST BP LT 130 MM HG: CPT | Performed by: INTERNAL MEDICINE

## 2024-02-20 PROCEDURE — 99215 OFFICE O/P EST HI 40 MIN: CPT | Performed by: INTERNAL MEDICINE

## 2024-02-20 PROCEDURE — RXMED WILLOW AMBULATORY MEDICATION CHARGE

## 2024-02-20 PROCEDURE — 1036F TOBACCO NON-USER: CPT | Performed by: INTERNAL MEDICINE

## 2024-02-20 PROCEDURE — 93005 ELECTROCARDIOGRAM TRACING: CPT | Performed by: INTERNAL MEDICINE

## 2024-02-20 PROCEDURE — 93010 ELECTROCARDIOGRAM REPORT: CPT | Performed by: INTERNAL MEDICINE

## 2024-02-20 PROCEDURE — 3078F DIAST BP <80 MM HG: CPT | Performed by: INTERNAL MEDICINE

## 2024-02-20 RX ORDER — PRAVASTATIN SODIUM 80 MG/1
80 TABLET ORAL DAILY
Qty: 90 TABLET | Refills: 3 | Status: SHIPPED | OUTPATIENT
Start: 2024-02-20 | End: 2025-02-19

## 2024-02-20 RX ORDER — SACUBITRIL AND VALSARTAN 49; 51 MG/1; MG/1
1 TABLET, FILM COATED ORAL 2 TIMES DAILY
Qty: 180 TABLET | Refills: 3 | Status: SHIPPED | OUTPATIENT
Start: 2024-02-20 | End: 2025-02-19

## 2024-02-20 RX ORDER — METOPROLOL SUCCINATE 50 MG/1
50 TABLET, EXTENDED RELEASE ORAL DAILY
Qty: 90 TABLET | Refills: 3 | Status: SHIPPED | OUTPATIENT
Start: 2024-02-20 | End: 2025-02-19

## 2024-02-20 RX ORDER — FUROSEMIDE 40 MG/1
40 TABLET ORAL DAILY
Qty: 90 TABLET | Refills: 3 | Status: SHIPPED | OUTPATIENT
Start: 2024-02-20 | End: 2025-02-19

## 2024-02-20 RX ORDER — AMLODIPINE BESYLATE 10 MG/1
10 TABLET ORAL DAILY
Qty: 90 TABLET | Refills: 3 | Status: SHIPPED | OUTPATIENT
Start: 2024-02-20 | End: 2025-02-19

## 2024-02-20 ASSESSMENT — ENCOUNTER SYMPTOMS
OCCASIONAL FEELINGS OF UNSTEADINESS: 1
LOSS OF SENSATION IN FEET: 0
DEPRESSION: 0

## 2024-02-20 NOTE — ASSESSMENT & PLAN NOTE
Currently, it appears that he remains without symptomatology of angina.  However, he does also admit that he does not do much.   -Continue on current medications and risk stratify with laboratory studies, echocardiography  -

## 2024-02-20 NOTE — PROGRESS NOTES
"Chief Complaint:   Follow-up (1 year fuv)     History Of Present Illness:    Dallas Ruiz Jr. is a 64 y.o. male presenting with a pertinent medical history of PMH of CAD (s/p SILVINO to distal RCA in 3/2013 and proximal RCA in 7/2014, prox and n=mid LAD in 10/2014), HTN, HF (EF 25-30% and diastolic dysfx, Recovered to 40-45% on 02/2018) poorly controlled DM c/b toe amputation and neuropathy, HLD who seen today in the cardiology clinic for follow-up        He was last seen in January 2021. He was supposed to have return for routine follow-up. He did not. He was admitted in October 2021 with COVID-19 respiratory issues. Then, he was readmitted in December 2021 with acute on chronic heart failure exacerbation. Since roughly October, he reports that he has been having persistent shortness of breath. He has not followed up with anyone. He reports that he has been out of medication since at least December 2021. Again, he has not sought to have refills provided for him. Today he presents to cardiology in somewhat distress. Reports worsening shortness of breath. Reports dyspnea on exertion. Reports orthopnea. Expressed her concern that maybe he needs to be admitted to which she declines. He is agreeable to going to the lab for blood work done.      ( 1/13/2022) He has been absent for a while from all medical care because \"I just didn;t care\" He has been out of medications for \"Months\" t States that he has been \"going through a lot\" and just didn't care. He admits tepression, but contracts for safety. States that he had previously asked for help with depression, but nothing came of it. He is agreeable to referral today. Reports that he has been having a hard time with living arrangemnets and costs.      2/16/2023 -- He returns for a virtual follow up. H he had originally been planned for an office follow-up, but unfortunately, due to ride availability was not able to attend in person. As such, appointment was changed to " "telehealth to allow continued follow-up and to avoid a similar occurrence where he becomes lost to medical care for a protracted period of time. Since his last appointment, he reports that he has been feeling well. He has been compliant with medications. He does note that maybe his medications are little bit too \"too much\" for him as he reports lightheadedness, dizziness on occasions when rising from a seated position to standing position. He states that his primary doctor said that his \"blood pressure falls\" at times.    2/20/2024 -- He returns for follow up. He has been absent for a year again. He \"was going through so much\"  and \"Needed to get my head straight\"  States that he has been fighting for 2 years to get into new housing related to [address] [employer], moved into a new residence in November 2023.  He reports that he has been out of his medications for several months, reported issues with his Doctor, His Pharmacy etc.. reports that this is getting fixed.  He notes that he again has had multiple months of not taking his medication.  It is unclear whether or not he is having cardiovascular symptomatology is he does not do much, continues to work with therapist to try and help him get \"really\".        Patient denies chest pain and angina. Pt reports shortness of breath, dyspnea on exertion, orthopnea, and paroxysmal nocturnal dyspnea. Pt reports worsening lower extremity edema. Pt denies palpitations or syncope. No recent falls. No fever or chills. No cough. No change in bowel or bladder habits. No travel. No sick contacts. No recent travel     Past medical history: As above.  Medications: Reviewed.  Allergies: Reviewed.  Social history: Patient denies smoking, alcohol abuse, or illicit drug use.  Family history: No sudden cardiac death or premature coronary artery disease.   .     Last Recorded Vitals:  Vitals:    02/20/24 0831   BP: 112/79   Patient Position: Sitting   Pulse: 95   SpO2: 97%   Weight: (!) 163 kg (359 " "lb)   Height: 1.88 m (6' 2\")       Past Medical History:  He has a past medical history of Age-related nuclear cataract, bilateral (11/13/2015), Calculus of ureter (02/20/2020), Encounter for screening for malignant neoplasm of prostate (01/28/2022), Encounter for screening for other disorder (02/13/2022), Gout, unspecified (01/19/2019), Morbid (severe) obesity due to excess calories (CMS/[facility]), Old myocardial infarction, Personal history of other diseases of the circulatory system (02/06/2019), Personal history of other diseases of the circulatory system (03/06/2019), Personal history of other diseases of the musculoskeletal system and connective tissue, Personal history of other diseases of the respiratory system (10/17/2014), Personal history of other endocrine, nutritional and metabolic disease (03/26/2014), Personal history of other mental and behavioral disorders, Primary open-angle glaucoma, unspecified eye, stage unspecified (11/13/2015), Pure hypercholesterolemia, unspecified, and Unqualified visual loss, both eyes (11/14/2014).    Past Surgical History:  He has a past surgical history that includes Hernia repair (01/17/2014); Coronary angioplasty with stent (03/26/2014); Other surgical history (10/17/2014); and CT angio abdomen pelvis w and or wo IV IV contrast (2/12/2020).      Social History:  He reports that he quit smoking about 36 years ago. His smoking use included cigarettes. He has been exposed to tobacco smoke. He has never used smokeless tobacco. He reports that he does not drink alcohol and does not use drugs.    Family History:  Family History   Problem Relation Name Age of Onset    Diabetes Mother      Breast cancer Mother      Heart attack Father      Heart attack Brother          Allergies:  Amoxicillin and Bupropion    Outpatient Medications:  Current Outpatient Medications   Medication Instructions    albuterol 90 mcg/actuation inhaler 1-2 puffs, inhalation, As needed, Every 4 to 6 hours    " "albuterol 2.5 mg, nebulization, Every 6 hours PRN    amLODIPine (NORVASC) 10 mg, oral, Daily    aspirin 81 mg EC tablet 1 tablet, oral, Daily    blood-glucose meter misc TO USE with test strips and lancets as directed to check blood sugar 2-3  times per day    clotrimazole (Lotrimin) 1 % cream Apply to affected area two times a day for 7 days.    docusate sodium (Colace) 100 mg capsule 1 capsule, oral, 2 times daily PRN    fluticasone (Flonase) 50 mcg/actuation nasal spray     furosemide (LASIX) 40 mg, oral, Daily    insulin NPH and regular human (HumuLIN 70/30 U-100 Insulin) 100 unit/mL (70-30) injection INJECT 70 UNITS SUBCUTANEOUSLY BEFORE BREAKFAST AND 70 UNITS BEFORE DINNER AS DIRECTED    insulin syringe-needle U-100 1 mL 30 gauge x 1/2\" syringe Use 2 daily for insulin injections    insulin syringe-needle U-100 30G X 1/2\" 0.5 mL syringe USE TO INJECT SUBCUTANEOUSLY TWICE A DAY    metoprolol succinate XL (TOPROL-XL) 50 mg, oral, Daily    montelukast (SINGULAIR) 10 mg, oral, Daily    nitroglycerin (Nitrostat) 0.3 mg SL tablet 1 tablet, sublingual, Every 5 minutes times 3 doses. If chest pain continues, call 911    polyethylene glycol (GLYCOLAX, MIRALAX) 16 g, oral, Daily, In 8 ounces of water, juice, or tea and drink    pravastatin (PRAVACHOL) 80 mg, oral, Daily    sacubitriL-valsartan (Entresto) 49-51 mg tablet 1 tablet, oral, 2 times daily    sertraline (ZOLOFT) 25 mg, oral, Daily    tadalafil (CIALIS) 10 mg, oral, Daily PRN    tamsulosin (FLOMAX) 0.8 mg, oral, Daily, 1/2 hour after same meal or at bedtime with snack    ticagrelor (BRILINTA) 90 mg, oral, 2 times daily       Physical Exam:  Constitutional:       Appearance: Not in distress.   Neck:      Thyroid: Thyroid normal.      Vascular: JVD normal.   Pulmonary:      Effort: Increased respiratory effort.      Breath sounds: Normal breath sounds.   Chest:      Chest wall: Not tender to palpatation.   Cardiovascular:      Normal rate. Regular rhythm. Normal " S1. Normal S2.       S3 Gallop.  No click. No rub.   Pulses:     Intact distal pulses.   Edema:     Ankle: bilateral 1+ edema of the ankle.  Abdominal:      General: Bowel sounds are normal.   Musculoskeletal: Normal range of motion. Skin:     General: Skin is warm.   Neurological:      General: No focal deficit present.      Mental Status: Alert and oriented to person, place and time.   Psychiatric:         Attention and Perception: Attention normal.         Mood and Affect: Affect is flat.         Speech: Speech normal.         Behavior: Behavior is withdrawn.              Last Labs:  CBC -  Lab Results   Component Value Date    WBC 8.0 01/24/2023    HGB 10.9 (L) 01/24/2023    HCT 36.2 (L) 01/24/2023    MCV 73 (L) 01/24/2023     01/24/2023       CMP -  Lab Results   Component Value Date    CALCIUM 9.5 03/31/2022    PHOS 3.3 03/31/2022    PROT 6.3 (L) 12/20/2021    ALBUMIN 4.0 03/31/2022    AST 18 12/20/2021    ALT 17 12/20/2021    ALKPHOS 62 12/20/2021    BILITOT 0.3 12/20/2021       LIPID PANEL -   Lab Results   Component Value Date    CHOL 109 03/31/2022    TRIG 110 01/28/2022    HDL 31.0 (A) 03/31/2022    CHHDL 3.5 03/31/2022    LDLF 54 01/28/2022    VLDL 22 01/28/2022       RENAL FUNCTION PANEL -   Lab Results   Component Value Date    GLUCOSE 167 (H) 03/31/2022     03/31/2022    K 4.0 03/31/2022     03/31/2022    CO2 25 03/31/2022    ANIONGAP 13 03/31/2022    BUN 10 03/31/2022    CREATININE 1.25 03/31/2022    GFRMALE 65 03/31/2022    CALCIUM 9.5 03/31/2022    PHOS 3.3 03/31/2022    ALBUMIN 4.0 03/31/2022        Lab Results   Component Value Date    BNP 71 03/31/2022    HGBA1C 13.2 (A) 02/15/2024    HGBA1C 7.1 (H) 10/04/2019       Last Cardiology Tests:  ECG:  In Office EKG 2/20/2024 --> Sinus Rhythm with PAC and Anterolateral Infarction Pattern     Echo:  Echocardiogram 2/2023   1. Poorly visualized anatomical structures due to suboptimal image quality.   2. Left ventricular systolic  function is low normal with a 50-55% estimated ejection fraction.   3. No left ventricular thrombus visualized.   4. Within the limitations of the study, no significant changes noted.    Echocardiogram 10/2021   1. The left ventricular systolic function is low normal with a 50-55% estimated ejection fraction.   2. Spectral Doppler shows an impaired relaxation pattern of left ventricular diastolic filling.   3. The pulmonary artery is not well visualized.    Cath:  Coronary Angiography:  The coronary circulation is right dominant.     Left Main Coronary Artery:  The left main coronary artery is a normal caliber vessel. The left main arises normally from the left coronary sinus of Valsalva and bifurcates into the LAD and circumflex coronary arteries. The left main coronary artery showed no significant disease or stenosis greater than 30%.     Left Anterior Descending Coronary Artery Distribution:  The left anterior descending coronary artery is a normal caliber vessel. The LAD arises normally from the left main coronary artery. The LAD is a medium caliber vessel that was previously 100% occluded in the middle third due to ISR. It has now recanalized. There are stents in the proximal and middle thirds of the vesselthat have severe in-stent restenosis along their entire length. There is a jailed first diagonal branch. There is severe diffuse disease in the distal and apical LAD.     Circumflex Coronary Artery Distribution:  The circumflex coronary artery is a normal caliber vessel. The circumflex arises normally from the left main coronary artery and terminates in the AV groove. The circumflex revealed atherosclerotic disease. The proximal circumflex coronary artery showed 90% stenosis.     Ramus Intermedius:  The ramus intermedius arises normally from the left main coronary artery. The ramus intermedius showed mild irregularities.     Right Coronary Artery Distribution:     The right coronary artery is a normal caliber  vessel. The RCA arises normally from the right sinus of Valsalva. The RCA showed moderate irregularities. The mid right coronary artery showed 60% stenosis.     Coronary Interventions:  Angiography reveals a 90% stenosis of the proximal circumflex. Pre-intervention VILMA flow was 3. Percutaneous coronary intervention was performed within the proximal circumflex. The vessel was pre-dilated using a non-compliant balloon 2.5 mm x 12 mm at 16 BAILEY. Resolute Orlando drug-eluting stent 3.0 mm x 18 mm was advanced to the lesion and implanted at 20 BAILEY. The stent was post dilated using a non-compliant balloon 3.0 mm x 12 mm at 26 BAILEY. Additional dilatation was performed using a non-compliant balloon 3.5 mm x 6 mm at 16 BAILEY. The stenosis was successfully reduced from 90% to <10%. Post-intervention VILMA flow was 3.     Fractional Flow Reserve (FFR) of the right coronary artery lesion was performed. Baseline Pd/Pa was 1.0. The FFR after hyperemia 140 mcg/kg/min, was 0.99.     Coronary Lesion Summary:  Vessel       Stenosis   Vessel Segment  Circumflex 90% stenosis    proximal  RCA        60% stenosis      mid     Stress Test:  No results found for this or any previous visit from the past 1095 days.    =  Cardiac Imaging:  No results found for this or any previous visit from the past 1095 days.        Lab review: I have personally reviewed the laboratory result(s)   Diagnostic review: I have personally reviewed the result(s) of the EKG and Echocardiogram .   Imaging review: I have  personally reviewed the result(s) cath report    Assessment/Plan     Problem List Items Addressed This Visit       Arteriosclerotic cardiovascular disease - Primary    Overview     Vessel       Stenosis   Vessel Segment  Circumflex 90% stenosis    proximal -->  Resolute Orlando drug-eluting stent 3.0 mm x 18 mm was advanced to the lesion and implanted at 20 BAILEY  RCA        60% stenosis      mid            Current Assessment & Plan     Currently, it appears  that he remains without symptomatology of angina.  However, he does also admit that he does not do much.   -Continue on current medications and risk stratify with laboratory studies, echocardiography  -         Relevant Orders    ECG 12 lead (Clinic Performed)    CBC and Auto Differential    Comprehensive metabolic panel    B-Type Natriuretic Peptide    Troponin I, High Sensitivity    TSH with reflex to Free T4 if abnormal    Hemoglobin A1C    Transthoracic echo (TTE) complete    Atypical chest pain    Relevant Medications    amLODIPine (Norvasc) 10 mg tablet    ticagrelor (Brilinta) 90 mg tablet    Other Relevant Orders    ECG 12 lead (Clinic Performed)    CBC and Auto Differential    Comprehensive metabolic panel    B-Type Natriuretic Peptide    Troponin I, High Sensitivity    Transthoracic echo (TTE) complete    Benign essential hypertension    Overview     Currently controlled blood pressure goal less than 120 mmHg         Relevant Medications    amLODIPine (Norvasc) 10 mg tablet    furosemide (Lasix) 40 mg tablet    Other Relevant Orders    ECG 12 lead (Clinic Performed)    Comprehensive metabolic panel    Troponin I, High Sensitivity    Hemoglobin A1C    Transthoracic echo (TTE) complete    CHF (congestive heart failure) (CMS/[facility])    Overview     Echocardiogram 2/2023   1. Poorly visualized anatomical structures due to suboptimal image quality.   2. Left ventricular systolic function is low normal with a 50-55% estimated ejection fraction.  Echocardiogram 10/2021   1. The left ventricular systolic function is low normal with a 50-55% estimated ejection fraction.         Current Assessment & Plan     Appears currently controlled  --Warm, well-perfused  --Repeat echocardiogram to be stratify  --Laboratory studies to allow medication titration as well as to reassess baseline control of disease state         Relevant Medications    amLODIPine (Norvasc) 10 mg tablet    furosemide (Lasix) 40 mg tablet    metoprolol  "succinate XL (Toprol-XL) 50 mg 24 hr tablet    sacubitriL-valsartan (Entresto) 49-51 mg tablet    ticagrelor (Brilinta) 90 mg tablet    Other Relevant Orders    ECG 12 lead (Clinic Performed)    CBC and Auto Differential    Comprehensive metabolic panel    B-Type Natriuretic Peptide    Troponin I, High Sensitivity    TSH with reflex to Free T4 if abnormal    Hemoglobin A1C    Transthoracic echo (TTE) complete    Hyperlipidemia    Overview     The ASCVD Risk score (Carin DK, et al., 2019) failed to calculate for the following reasons:    The valid total cholesterol range is 130 to 320 mg/dL    Lab Results   Component Value Date    CHOL 109 03/31/2022    CHOL 120 01/28/2022    CHOL 124 10/24/2021     Lab Results   Component Value Date    HDL 31.0 (A) 03/31/2022    HDL 43.9 01/28/2022    HDL 33.2 (A) 01/26/2021   No results found for: \"LDLCALC\"  Lab Results   Component Value Date    TRIG 110 01/28/2022    TRIG 132 12/18/2018   No components found for: \"CHOLHDL\"           Relevant Medications    pravastatin (Pravachol) 80 mg tablet    Other Relevant Orders    ECG 12 lead (Clinic Performed)    Comprehensive metabolic panel    Lipid Panel Non-Fasting    TSH with reflex to Free T4 if abnormal    Transthoracic echo (TTE) complete    Left ventricular systolic dysfunction, NYHA class 3    Relevant Medications    amLODIPine (Norvasc) 10 mg tablet    metoprolol succinate XL (Toprol-XL) 50 mg 24 hr tablet    sacubitriL-valsartan (Entresto) 49-51 mg tablet    ticagrelor (Brilinta) 90 mg tablet    Other Relevant Orders    ECG 12 lead (Clinic Performed)    CBC and Auto Differential    Comprehensive metabolic panel    B-Type Natriuretic Peptide    TSH with reflex to Free T4 if abnormal    Hemoglobin A1C    Transthoracic echo (TTE) complete         Danny Drew, DO  "

## 2024-02-20 NOTE — ASSESSMENT & PLAN NOTE
Appears currently controlled  --Warm, well-perfused  --Repeat echocardiogram to be stratify  --Laboratory studies to allow medication titration as well as to reassess baseline control of disease state

## 2024-02-20 NOTE — PATIENT INSTRUCTIONS
"It was a pleasure talking with you today    Please schedule an echocardiogram at [facility]    Please have blood work done     We ill see you 1-2 weeks after your echocardiogram    Please reach out to your PCP for your insulin      - DASH stands for \"dietary approaches to stop hypertension.\" The DASH diet is low in total and saturated fat. It is rich in fruits, vegetables, and low-fat dairy foods. The diet allows you to get natural fiber, calcium, and magnesium from food. It prevents or lowers high blood pressure. It can also help lower cholesterol in your blood. Don't change how you eat all at once. It's much more likely that you'll succeed if you make only one or two small changes at a time. Wait until those changes are a habit, then make a couple more changes. Some good starting steps include: Add one serving of vegetables to your meals at lunch and dinner. This is an easy way to help you get more vegetables in your diet. Have a piece of fruit as an afternoon or after-dinner snack. One glass of juice at breakfast is not enough fruit in your diet. Use half your usual amount of butter, margarine, or salad dressing. Buy nonfat salad dressing or nonfat sour cream. Follow this guide to select your menu of meals. The number of calories we want you to eat each day will tell you how many servings you can choose from each food group.     https://www.nhlbi.nih.gov/health-topics/dash-eating-plan        - We recommend you follow a heart healthy diet. Watch food labels and try not to eat more than 2,500 mg of sodium per day. Avoid foods high in salt like processed meats (lunch meats, carr, and sausage), processed foods (boxed dinners, canned soups), fried and fast foods. Monitor serving sizes and if the sodium per serving size is more than 200 mg, avoid those foods. If the sodium per serving size is between 100-200 mg, you can use those in limited quantities. Try to choose foods where the amount of sodium per serving " size is less than 100 mg. Try to eat a diet rich in fruits and vegetables, whole grains, low fat dairy products, skinless poultry and fish, nuts, beans, non-tropical vegetable oils. Limit saturated fat, trans fat, sodium, red meats, and sugar-sweetened beverages.   Limit alcohol      -The combination of a reduced-calorie diet and increased physical activity is recommended. Adults should aim to get at least 150 minutes of moderate physical activity per week (30 minutes of moderate physical activities at least 5 days per week). Examples of moderate physical activities include brisk walking, swimming, aerobic dancing, heavy gardening, jumping rope, bicycling 10 MPH or faster, tennis, hiking uphill or with a heavy backpack. Please let us know if you would like to learn more about your nutrition and calories and additional options including weight loss programs to help you reach your goal.      -If you smoke, stop smoking. iIf you stop smoking you can help get rid of a major source of stress to your heart. Smoking makes your heart rate and blood pressure go up and increases your risk or developing cardiovascular diseases and worsen symptoms associated with heart failure.      -Obtain a BP monitor and monitor your BP daily. Check it around the same time each day; at least 1 hour after taking your medications. Record your BP in a log and bring your log with you to your doctor?s appointment.      -F/u with your PCP as recommended.

## 2024-02-21 PROCEDURE — RXMED WILLOW AMBULATORY MEDICATION CHARGE

## 2024-02-23 ENCOUNTER — PHARMACY VISIT (OUTPATIENT)
Dept: PHARMACY | Facility: CLINIC | Age: 64
End: 2024-02-23
Payer: MEDICAID

## 2024-02-29 PROCEDURE — RXMED WILLOW AMBULATORY MEDICATION CHARGE

## 2024-03-01 PROCEDURE — RXMED WILLOW AMBULATORY MEDICATION CHARGE

## 2024-03-04 ENCOUNTER — PHARMACY VISIT (OUTPATIENT)
Dept: PHARMACY | Facility: CLINIC | Age: 64
End: 2024-03-04
Payer: MEDICAID

## 2024-03-05 ENCOUNTER — PHARMACY VISIT (OUTPATIENT)
Dept: PHARMACY | Facility: CLINIC | Age: 64
End: 2024-03-05
Payer: MEDICAID

## 2024-03-08 ENCOUNTER — OFFICE VISIT (OUTPATIENT)
Dept: PRIMARY CARE | Facility: CLINIC | Age: 64
End: 2024-03-08
Payer: COMMERCIAL

## 2024-03-08 ENCOUNTER — LAB (OUTPATIENT)
Dept: LAB | Facility: LAB | Age: 64
End: 2024-03-08
Payer: COMMERCIAL

## 2024-03-08 VITALS
DIASTOLIC BLOOD PRESSURE: 74 MMHG | HEART RATE: 76 BPM | OXYGEN SATURATION: 98 % | SYSTOLIC BLOOD PRESSURE: 116 MMHG | BODY MASS INDEX: 47.2 KG/M2 | WEIGHT: 315 LBS

## 2024-03-08 DIAGNOSIS — M54.42 CHRONIC RIGHT-SIDED LOW BACK PAIN WITH BILATERAL SCIATICA: Primary | Chronic | ICD-10-CM

## 2024-03-08 DIAGNOSIS — Z79.4 TYPE 2 DIABETES MELLITUS WITH BOTH EYES AFFECTED BY MILD NONPROLIFERATIVE RETINOPATHY WITHOUT MACULAR EDEMA, WITH LONG-TERM CURRENT USE OF INSULIN (MULTI): ICD-10-CM

## 2024-03-08 DIAGNOSIS — M54.41 CHRONIC RIGHT-SIDED LOW BACK PAIN WITH BILATERAL SCIATICA: Primary | Chronic | ICD-10-CM

## 2024-03-08 DIAGNOSIS — I25.10 ARTERIOSCLEROTIC CARDIOVASCULAR DISEASE: ICD-10-CM

## 2024-03-08 DIAGNOSIS — G89.29 CHRONIC RIGHT-SIDED LOW BACK PAIN WITH BILATERAL SCIATICA: Primary | Chronic | ICD-10-CM

## 2024-03-08 DIAGNOSIS — N39.46 URGE AND STRESS INCONTINENCE: ICD-10-CM

## 2024-03-08 DIAGNOSIS — I50.22 CHRONIC SYSTOLIC CONGESTIVE HEART FAILURE (MULTI): ICD-10-CM

## 2024-03-08 DIAGNOSIS — D50.9 IRON DEFICIENCY ANEMIA, UNSPECIFIED IRON DEFICIENCY ANEMIA TYPE: ICD-10-CM

## 2024-03-08 DIAGNOSIS — J30.9 ALLERGIC RHINITIS, UNSPECIFIED SEASONALITY, UNSPECIFIED TRIGGER: ICD-10-CM

## 2024-03-08 DIAGNOSIS — E66.01 CLASS 3 SEVERE OBESITY DUE TO EXCESS CALORIES WITH SERIOUS COMORBIDITY AND BODY MASS INDEX (BMI) OF 45.0 TO 49.9 IN ADULT (MULTI): ICD-10-CM

## 2024-03-08 DIAGNOSIS — I51.89 LEFT VENTRICULAR SYSTOLIC DYSFUNCTION, NYHA CLASS 3: ICD-10-CM

## 2024-03-08 DIAGNOSIS — J45.20 MILD INTERMITTENT ASTHMA, UNSPECIFIED WHETHER COMPLICATED (HHS-HCC): ICD-10-CM

## 2024-03-08 DIAGNOSIS — I50.9 CONGESTIVE HEART FAILURE, UNSPECIFIED HF CHRONICITY, UNSPECIFIED HEART FAILURE TYPE (MULTI): ICD-10-CM

## 2024-03-08 DIAGNOSIS — N39.3 STRESS INCONTINENCE, MALE: ICD-10-CM

## 2024-03-08 DIAGNOSIS — E11.3293 TYPE 2 DIABETES MELLITUS WITH BOTH EYES AFFECTED BY MILD NONPROLIFERATIVE RETINOPATHY WITHOUT MACULAR EDEMA, WITH LONG-TERM CURRENT USE OF INSULIN (MULTI): ICD-10-CM

## 2024-03-08 DIAGNOSIS — E78.2 MIXED HYPERLIPIDEMIA: ICD-10-CM

## 2024-03-08 DIAGNOSIS — I10 BENIGN ESSENTIAL HYPERTENSION: ICD-10-CM

## 2024-03-08 DIAGNOSIS — R07.89 ATYPICAL CHEST PAIN: ICD-10-CM

## 2024-03-08 LAB
ALBUMIN SERPL BCP-MCNC: 3.7 G/DL (ref 3.4–5)
ALP SERPL-CCNC: 70 U/L (ref 33–136)
ALT SERPL W P-5'-P-CCNC: 9 U/L (ref 10–52)
ANION GAP SERPL CALC-SCNC: 10 MMOL/L (ref 10–20)
AST SERPL W P-5'-P-CCNC: 13 U/L (ref 9–39)
BASOPHILS # BLD AUTO: 0.01 X10*3/UL (ref 0–0.1)
BASOPHILS NFR BLD AUTO: 0.2 %
BILIRUB SERPL-MCNC: 0.6 MG/DL (ref 0–1.2)
BNP SERPL-MCNC: 55 PG/ML (ref 0–99)
BUN SERPL-MCNC: 9 MG/DL (ref 6–23)
CALCIUM SERPL-MCNC: 9.3 MG/DL (ref 8.6–10.6)
CARDIAC TROPONIN I PNL SERPL HS: 15 NG/L (ref 0–53)
CHLORIDE SERPL-SCNC: 107 MMOL/L (ref 98–107)
CHOLEST SERPL-MCNC: 122 MG/DL (ref 0–199)
CHOLEST SERPL-MCNC: 122 MG/DL (ref 0–199)
CHOLESTEROL/HDL RATIO: 3.7
CHOLESTEROL/HDL RATIO: 3.7
CO2 SERPL-SCNC: 28 MMOL/L (ref 21–32)
CREAT SERPL-MCNC: 1.08 MG/DL (ref 0.5–1.3)
EGFRCR SERPLBLD CKD-EPI 2021: 77 ML/MIN/1.73M*2
EOSINOPHIL # BLD AUTO: 0.12 X10*3/UL (ref 0–0.7)
EOSINOPHIL NFR BLD AUTO: 2 %
ERYTHROCYTE [DISTWIDTH] IN BLOOD BY AUTOMATED COUNT: 15.9 % (ref 11.5–14.5)
EST. AVERAGE GLUCOSE BLD GHB EST-MCNC: 301 MG/DL
GLUCOSE SERPL-MCNC: 111 MG/DL (ref 74–99)
HBA1C MFR BLD: 12.1 %
HCT VFR BLD AUTO: 42.1 % (ref 41–52)
HDLC SERPL-MCNC: 32.6 MG/DL
HDLC SERPL-MCNC: 32.6 MG/DL
HGB BLD-MCNC: 13.4 G/DL (ref 13.5–17.5)
IMM GRANULOCYTES # BLD AUTO: 0.01 X10*3/UL (ref 0–0.7)
IMM GRANULOCYTES NFR BLD AUTO: 0.2 % (ref 0–0.9)
IRON SATN MFR SERPL: 11 % (ref 25–45)
IRON SERPL-MCNC: 36 UG/DL (ref 35–150)
LDLC SERPL CALC-MCNC: 72 MG/DL
LYMPHOCYTES # BLD AUTO: 2.1 X10*3/UL (ref 1.2–4.8)
LYMPHOCYTES NFR BLD AUTO: 34.7 %
MCH RBC QN AUTO: 27.9 PG (ref 26–34)
MCHC RBC AUTO-ENTMCNC: 31.8 G/DL (ref 32–36)
MCV RBC AUTO: 88 FL (ref 80–100)
MONOCYTES # BLD AUTO: 0.49 X10*3/UL (ref 0.1–1)
MONOCYTES NFR BLD AUTO: 8.1 %
NEUTROPHILS # BLD AUTO: 3.33 X10*3/UL (ref 1.2–7.7)
NEUTROPHILS NFR BLD AUTO: 54.8 %
NON HDL CHOLESTEROL: 89 MG/DL (ref 0–149)
NON-HDL CHOLESTEROL: 89 MG/DL (ref 0–149)
NRBC BLD-RTO: 0 /100 WBCS (ref 0–0)
PHOSPHATE SERPL-MCNC: 3.3 MG/DL (ref 2.5–4.9)
PLATELET # BLD AUTO: 207 X10*3/UL (ref 150–450)
POTASSIUM SERPL-SCNC: 4.2 MMOL/L (ref 3.5–5.3)
PROT SERPL-MCNC: 6.4 G/DL (ref 6.4–8.2)
RBC # BLD AUTO: 4.8 X10*6/UL (ref 4.5–5.9)
SODIUM SERPL-SCNC: 141 MMOL/L (ref 136–145)
TIBC SERPL-MCNC: 340 UG/DL (ref 240–445)
TRIGL SERPL-MCNC: 88 MG/DL (ref 0–149)
TSH SERPL-ACNC: 1.55 MIU/L (ref 0.44–3.98)
UIBC SERPL-MCNC: 304 UG/DL (ref 110–370)
VLDL: 18 MG/DL (ref 0–40)
WBC # BLD AUTO: 6.1 X10*3/UL (ref 4.4–11.3)

## 2024-03-08 PROCEDURE — 80061 LIPID PANEL: CPT

## 2024-03-08 PROCEDURE — 84484 ASSAY OF TROPONIN QUANT: CPT

## 2024-03-08 PROCEDURE — 36415 COLL VENOUS BLD VENIPUNCTURE: CPT

## 2024-03-08 PROCEDURE — 85025 COMPLETE CBC W/AUTO DIFF WBC: CPT

## 2024-03-08 PROCEDURE — 99214 OFFICE O/P EST MOD 30 MIN: CPT | Performed by: INTERNAL MEDICINE

## 2024-03-08 PROCEDURE — 83540 ASSAY OF IRON: CPT

## 2024-03-08 PROCEDURE — 3078F DIAST BP <80 MM HG: CPT | Performed by: INTERNAL MEDICINE

## 2024-03-08 PROCEDURE — 83880 ASSAY OF NATRIURETIC PEPTIDE: CPT

## 2024-03-08 PROCEDURE — 83718 ASSAY OF LIPOPROTEIN: CPT

## 2024-03-08 PROCEDURE — 83036 HEMOGLOBIN GLYCOSYLATED A1C: CPT

## 2024-03-08 PROCEDURE — 3008F BODY MASS INDEX DOCD: CPT | Performed by: INTERNAL MEDICINE

## 2024-03-08 PROCEDURE — 80053 COMPREHEN METABOLIC PANEL: CPT

## 2024-03-08 PROCEDURE — RXMED WILLOW AMBULATORY MEDICATION CHARGE

## 2024-03-08 PROCEDURE — 83550 IRON BINDING TEST: CPT

## 2024-03-08 PROCEDURE — 1036F TOBACCO NON-USER: CPT | Performed by: INTERNAL MEDICINE

## 2024-03-08 PROCEDURE — 84443 ASSAY THYROID STIM HORMONE: CPT

## 2024-03-08 PROCEDURE — 3074F SYST BP LT 130 MM HG: CPT | Performed by: INTERNAL MEDICINE

## 2024-03-08 PROCEDURE — 84100 ASSAY OF PHOSPHORUS: CPT

## 2024-03-08 PROCEDURE — 82465 ASSAY BLD/SERUM CHOLESTEROL: CPT

## 2024-03-08 RX ORDER — MONTELUKAST SODIUM 10 MG/1
10 TABLET ORAL DAILY
Qty: 90 TABLET | Refills: 3 | Status: SHIPPED | OUTPATIENT
Start: 2024-03-08

## 2024-03-08 RX ORDER — ALBUTEROL SULFATE 90 UG/1
1-2 AEROSOL, METERED RESPIRATORY (INHALATION) AS NEEDED
Qty: 18 G | Refills: 3 | Status: SHIPPED | OUTPATIENT
Start: 2024-03-08

## 2024-03-08 RX ORDER — TOPIRAMATE 25 MG/1
25 TABLET ORAL 2 TIMES DAILY
Qty: 60 TABLET | Refills: 2 | Status: SHIPPED | OUTPATIENT
Start: 2024-03-08 | End: 2024-05-28 | Stop reason: SDUPTHER

## 2024-03-08 RX ORDER — FERROUS SULFATE 325(65) MG
325 TABLET ORAL
COMMUNITY

## 2024-03-08 ASSESSMENT — PATIENT HEALTH QUESTIONNAIRE - PHQ9
7. TROUBLE CONCENTRATING ON THINGS, SUCH AS READING THE NEWSPAPER OR WATCHING TELEVISION: MORE THAN HALF THE DAYS
4. FEELING TIRED OR HAVING LITTLE ENERGY: MORE THAN HALF THE DAYS
3. TROUBLE FALLING OR STAYING ASLEEP OR SLEEPING TOO MUCH: SEVERAL DAYS
SUM OF ALL RESPONSES TO PHQ QUESTIONS 1-9: 12
8. MOVING OR SPEAKING SO SLOWLY THAT OTHER PEOPLE COULD HAVE NOTICED. OR THE OPPOSITE, BEING SO FIGETY OR RESTLESS THAT YOU HAVE BEEN MOVING AROUND A LOT MORE THAN USUAL: MORE THAN HALF THE DAYS
5. POOR APPETITE OR OVEREATING: SEVERAL DAYS
2. FEELING DOWN, DEPRESSED OR HOPELESS: SEVERAL DAYS
10. IF YOU CHECKED OFF ANY PROBLEMS, HOW DIFFICULT HAVE THESE PROBLEMS MADE IT FOR YOU TO DO YOUR WORK, TAKE CARE OF THINGS AT HOME, OR GET ALONG WITH OTHER PEOPLE: EXTREMELY DIFFICULT
9. THOUGHTS THAT YOU WOULD BE BETTER OFF DEAD, OR OF HURTING YOURSELF: NOT AT ALL
3. TROUBLE FALLING OR STAYING ASLEEP OR SLEEPING TOO MUCH: MORE THAN HALF THE DAYS
4. FEELING TIRED OR HAVING LITTLE ENERGY: SEVERAL DAYS
SUM OF ALL RESPONSES TO PHQ9 QUESTIONS 1 AND 2: 3
2. FEELING DOWN, DEPRESSED OR HOPELESS: SEVERAL DAYS
6. FEELING BAD ABOUT YOURSELF - OR THAT YOU ARE A FAILURE OR HAVE LET YOURSELF OR YOUR FAMILY DOWN: SEVERAL DAYS
5. POOR APPETITE OR OVEREATING: SEVERAL DAYS
6. FEELING BAD ABOUT YOURSELF - OR THAT YOU ARE A FAILURE OR HAVE LET YOURSELF OR YOUR FAMILY DOWN: SEVERAL DAYS
SUM OF ALL RESPONSES TO PHQ9 QUESTIONS 1 AND 2: 3
1. LITTLE INTEREST OR PLEASURE IN DOING THINGS: MORE THAN HALF THE DAYS
1. LITTLE INTEREST OR PLEASURE IN DOING THINGS: MORE THAN HALF THE DAYS

## 2024-03-08 ASSESSMENT — ENCOUNTER SYMPTOMS
CONSTITUTIONAL NEGATIVE: 1
BACK PAIN: 1
WHEEZING: 0
COUGH: 0
PALPITATIONS: 0
NUMBNESS: 1
PAIN: 1
DYSPHORIC MOOD: 1
SHORTNESS OF BREATH: 1
WEAKNESS: 1

## 2024-03-08 NOTE — PROGRESS NOTES
Patient ID: Dallas Ruiz Jr. is a 64 y.o. male who presents for Follow-up (Check up), form completion (For RTA), and Pain (Coming from both ankles, going up both legs).    /74   Pulse 76   Wt (!) 167 kg (367 lb 9.6 oz)   SpO2 98%   BMI 47.20 kg/m²     Pain  Associated symptoms include shortness of breath and weakness. Pertinent negatives include no chest pain or wheezing.           I AM HAVING PAIN IN LOWER BACK   PAIN SCALE 10/10   HURTS WHILE LYING DOWN   RADIATES TO BOTH LEGS   NO EXTREMITY WEAKNESS  NO BLADDER OR BOWEL INCONTINENCE       DM UNCONTROLLED   ON INSULIN       DM 2 WITH MICROALBUMINURIA   A1C 13.2 , PT SEEING DR LEE   ON INSULIN 70 UNITS IN AM AND 70 UNITS   PM     MORBIDLY OBESE   NO SLEEP APNEA       PT HAS CAD     S/P STENTS   PT SEEING CARDIOLOGY   DR DA JORDAN     PT HAS DEPRESSION NOT   TAKING SSRI , THOUGH PRESCRIBED   PT HAVING ZOOM CALL WITH THE THERAPIST       S/P CHF STABLE   NO PND , NO ORTHOPNEA , NO LEG SWELLING       DIABETES WITH NON PROLIFERATIVE RETINOPATHY W/O MACULAR  EDEMA       PT HAVING DIFFICULTY WITH WALKING AND GAIT   AND CANNOT STAND FOR TOO LONG NEED ASSISTANCE AND RIDE WITH RTA       Subjective     Review of Systems   Constitutional: Negative.    Respiratory:  Positive for shortness of breath. Negative for cough and wheezing.    Cardiovascular:  Positive for leg swelling. Negative for chest pain and palpitations.   Genitourinary:         URINARY INCONTINENCE   USING PAD    Musculoskeletal:  Positive for back pain.        RADIATES TO BOTH LEGS    Neurological:  Positive for weakness and numbness.   Psychiatric/Behavioral:  Positive for dysphoric mood.    All other systems reviewed and are negative.      Objective     Physical Exam  Vitals and nursing note reviewed.   Cardiovascular:      Rate and Rhythm: Normal rate and regular rhythm.      Pulses: Normal pulses.      Heart sounds: Normal heart sounds.   Pulmonary:      Effort: Pulmonary effort is  normal.      Breath sounds: Normal breath sounds.   Musculoskeletal:      Right lower leg: No edema.      Left lower leg: No edema.      Comments: LUMBAR FLEXION/EXTENSION PAINFUL   NO L. S TENDERNESS      Skin:     Capillary Refill: Capillary refill takes more than 3 seconds.   Neurological:      General: No focal deficit present.      Mental Status: He is oriented to person, place, and time. Mental status is at baseline.   Psychiatric:         Mood and Affect: Mood normal.         Behavior: Behavior normal.         Thought Content: Thought content normal.         Judgment: Judgment normal.         Lab Results   Component Value Date    WBC 8.0 01/24/2023    HGB 10.9 (L) 01/24/2023    HCT 36.2 (L) 01/24/2023    MCV 73 (L) 01/24/2023     01/24/2023           Problem List Items Addressed This Visit       Allergic rhinitis    Relevant Medications    montelukast (Singulair) 10 mg tablet    Asthma     STABLE         Relevant Medications    albuterol 90 mcg/actuation inhaler    CHF (congestive heart failure) (CMS/HCC)    Chronic right-sided low back pain with sciatica - Primary    Left ventricular systolic dysfunction, NYHA class 3    Body mass index 45.0-49.9, adult (CMS/[facility])    Stress incontinence, male    Type 2 diabetes mellitus with mild nonproliferative diabetic retinopathy without macular edema, bilateral (CMS/[facility])    Urge and stress incontinence    Class 3 severe obesity due to excess calories with serious comorbidity and body mass index (BMI) of 45.0 to 49.9 in adult (CMS/[facility])            A/P       TOPAMAX 25MG 1 PILL BID TO CONTINUE   CONTINUE TYLENOL 500MG 1 PILL THREE TIMES A DAY FOR PAIN   OFFERED PHYSICAL THERAPY , PT DEFFERED   ALBUTEROL INHALER USE DIRECTED   MONTELUKAST 10MG 1 PILL EVERY DAY FILLED  CAN USE MOIST HEAT WITH MICROWAVE HEATING PAD FOR THE BACK   BACK STRETCHING EXERCISES TOLERATED   CAN USE VOLTAREN GEL 1% APPLY TOPICALLY   RTA FORM COMPLETED   PT SEEING DR LEE FOR HIS  DIABETES  AT [facility]   PT SEEING DR DA JORDAN CARDIOLOGY   FOLLOW UP WITH ME IN 4 MONTHS TIME

## 2024-03-11 ENCOUNTER — PHARMACY VISIT (OUTPATIENT)
Dept: PHARMACY | Facility: CLINIC | Age: 64
End: 2024-03-11
Payer: MEDICAID

## 2024-03-22 ENCOUNTER — TELEPHONE (OUTPATIENT)
Dept: PRIMARY CARE | Facility: CLINIC | Age: 64
End: 2024-03-22
Payer: COMMERCIAL

## 2024-03-22 NOTE — TELEPHONE ENCOUNTER
Pt. States has a temp of 100, has body aches and headache for about a week. Asking what he can do.

## 2024-03-28 PROCEDURE — RXMED WILLOW AMBULATORY MEDICATION CHARGE

## 2024-04-01 PROCEDURE — RXMED WILLOW AMBULATORY MEDICATION CHARGE

## 2024-04-02 ENCOUNTER — PHARMACY VISIT (OUTPATIENT)
Dept: PHARMACY | Facility: CLINIC | Age: 64
End: 2024-04-02
Payer: MEDICAID

## 2024-04-03 PROCEDURE — RXMED WILLOW AMBULATORY MEDICATION CHARGE

## 2024-04-05 ENCOUNTER — PHARMACY VISIT (OUTPATIENT)
Dept: PHARMACY | Facility: CLINIC | Age: 64
End: 2024-04-05
Payer: MEDICAID

## 2024-04-11 DIAGNOSIS — K59.09 OTHER CONSTIPATION: ICD-10-CM

## 2024-04-11 DIAGNOSIS — I25.10 ARTERIOSCLEROTIC CARDIOVASCULAR DISEASE: ICD-10-CM

## 2024-04-11 PROCEDURE — RXMED WILLOW AMBULATORY MEDICATION CHARGE

## 2024-04-11 RX ORDER — DOCUSATE SODIUM 100 MG/1
100 CAPSULE, LIQUID FILLED ORAL 2 TIMES DAILY PRN
Qty: 60 CAPSULE | Refills: 5 | Status: SHIPPED | OUTPATIENT
Start: 2024-04-11

## 2024-04-11 RX ORDER — POLYETHYLENE GLYCOL 3350 17 G/17G
17 POWDER, FOR SOLUTION ORAL DAILY
Qty: 510 G | Refills: 5 | Status: SHIPPED | OUTPATIENT
Start: 2024-04-11

## 2024-04-11 RX ORDER — ASPIRIN 81 MG/1
81 TABLET ORAL DAILY
Qty: 90 TABLET | Refills: 1 | Status: SHIPPED | OUTPATIENT
Start: 2024-04-11

## 2024-04-16 ENCOUNTER — PHARMACY VISIT (OUTPATIENT)
Dept: PHARMACY | Facility: CLINIC | Age: 64
End: 2024-04-16
Payer: MEDICAID

## 2024-04-27 PROCEDURE — RXMED WILLOW AMBULATORY MEDICATION CHARGE

## 2024-04-30 ENCOUNTER — OFFICE VISIT (OUTPATIENT)
Dept: UROLOGY | Facility: CLINIC | Age: 64
End: 2024-04-30
Payer: COMMERCIAL

## 2024-04-30 VITALS — TEMPERATURE: 97.6 F

## 2024-04-30 DIAGNOSIS — N40.1 BPH WITH OBSTRUCTION/LOWER URINARY TRACT SYMPTOMS: ICD-10-CM

## 2024-04-30 DIAGNOSIS — N13.8 BPH WITH OBSTRUCTION/LOWER URINARY TRACT SYMPTOMS: ICD-10-CM

## 2024-04-30 DIAGNOSIS — N40.0 BENIGN PROSTATIC HYPERPLASIA, UNSPECIFIED WHETHER LOWER URINARY TRACT SYMPTOMS PRESENT: Primary | ICD-10-CM

## 2024-04-30 LAB
POC APPEARANCE, URINE: CLEAR
POC BILIRUBIN, URINE: NEGATIVE
POC BLOOD, URINE: NEGATIVE
POC COLOR, URINE: YELLOW
POC GLUCOSE, URINE: NEGATIVE MG/DL
POC KETONES, URINE: NEGATIVE MG/DL
POC LEUKOCYTES, URINE: NEGATIVE
POC NITRITE,URINE: NEGATIVE
POC PH, URINE: 7 PH
POC PROTEIN, URINE: NEGATIVE MG/DL
POC SPECIFIC GRAVITY, URINE: 1.02
POC UROBILINOGEN, URINE: 2 EU/DL

## 2024-04-30 PROCEDURE — 3048F LDL-C <100 MG/DL: CPT | Performed by: PHYSICIAN ASSISTANT

## 2024-04-30 PROCEDURE — RXMED WILLOW AMBULATORY MEDICATION CHARGE

## 2024-04-30 PROCEDURE — 3008F BODY MASS INDEX DOCD: CPT | Performed by: PHYSICIAN ASSISTANT

## 2024-04-30 PROCEDURE — 51798 US URINE CAPACITY MEASURE: CPT | Performed by: PHYSICIAN ASSISTANT

## 2024-04-30 PROCEDURE — 81003 URINALYSIS AUTO W/O SCOPE: CPT | Performed by: PHYSICIAN ASSISTANT

## 2024-04-30 PROCEDURE — 1036F TOBACCO NON-USER: CPT | Performed by: PHYSICIAN ASSISTANT

## 2024-04-30 PROCEDURE — 3046F HEMOGLOBIN A1C LEVEL >9.0%: CPT | Performed by: PHYSICIAN ASSISTANT

## 2024-04-30 PROCEDURE — 99213 OFFICE O/P EST LOW 20 MIN: CPT | Performed by: PHYSICIAN ASSISTANT

## 2024-04-30 RX ORDER — TAMSULOSIN HYDROCHLORIDE 0.4 MG/1
0.8 CAPSULE ORAL DAILY
Qty: 180 CAPSULE | Refills: 3 | Status: SHIPPED | OUTPATIENT
Start: 2024-04-30 | End: 2025-04-30

## 2024-04-30 ASSESSMENT — PAIN SCALES - GENERAL: PAINLEVEL: 0-NO PAIN

## 2024-04-30 ASSESSMENT — ENCOUNTER SYMPTOMS
DIFFICULTY URINATING: 0
HEMATOLOGIC/LYMPHATIC NEGATIVE: 1
GASTROINTESTINAL NEGATIVE: 1
ENDOCRINE NEGATIVE: 1
DYSURIA: 0
FREQUENCY: 1
PSYCHIATRIC NEGATIVE: 1
RESPIRATORY NEGATIVE: 1
NEUROLOGICAL NEGATIVE: 1
HEMATURIA: 0
CARDIOVASCULAR NEGATIVE: 1
EYES NEGATIVE: 1
ALLERGIC/IMMUNOLOGIC NEGATIVE: 1
CONSTITUTIONAL NEGATIVE: 1

## 2024-04-30 NOTE — ASSESSMENT & PLAN NOTE
Prescription of Flomax 0.8 mg sent to pharmacy.  I advised him to restart it right away.  I discussed the importance of tight control of diabetes which will reduce the frequency and urgency as well.    I advised him to restart his diuretics.  Patient is reluctant to do so at this time until urinary symptoms improved.    Patient will follow-up in 4 to 6 weeks to reevaluate symptoms or sooner as needed

## 2024-04-30 NOTE — PROGRESS NOTES
Subjective   Patient ID: Dallas Ruiz Jr. is a 64 y.o. male who presents for Benign Prostatic Hypertrophy.  HPI  65 yo with BPH on Flomax 0.8mg, nephrolithiasis last seen about two years ago seen due to worsening urinary symptoms     He reports a few months ago he started noticing urinary frequency, urgency, urgency incontinence. He denies slow stream or straining with urination.    Patient also has diabetes. He has not been on Insulin the last two months because he could not leave the house and run out of medications.  Most recent A1C 12    Patient is supposed to be on Diuretics twice a day. He has not been taking them since urinary symptoms worsened over the last few months.     UA negative  PVR minimal  Review of Systems   Constitutional: Negative.    HENT: Negative.     Eyes: Negative.    Respiratory: Negative.     Cardiovascular: Negative.    Gastrointestinal: Negative.    Endocrine: Negative.    Genitourinary:  Positive for frequency and urgency. Negative for difficulty urinating, dysuria and hematuria.   Skin: Negative.    Allergic/Immunologic: Negative.    Neurological: Negative.    Hematological: Negative.    Psychiatric/Behavioral: Negative.         Objective   Physical Exam  Constitutional:       General: He is not in acute distress.     Appearance: Normal appearance.   HENT:      Head: Normocephalic and atraumatic.      Nose: Nose normal.      Mouth/Throat:      Mouth: Mucous membranes are moist.   Cardiovascular:      Rate and Rhythm: Normal rate.   Pulmonary:      Effort: Pulmonary effort is normal.   Abdominal:      General: Abdomen is flat.      Palpations: Abdomen is soft.   Musculoskeletal:      Cervical back: Normal range of motion.   Neurological:      Mental Status: He is alert.         Assessment/Plan   Problem List Items Addressed This Visit             ICD-10-CM    BPH with obstruction/lower urinary tract symptoms N40.1, N13.8     Prescription of Flomax 0.8 mg sent to pharmacy.  I advised  him to restart it right away.  I discussed the importance of tight control of diabetes which will reduce the frequency and urgency as well.    I advised him to restart his diuretics.  Patient is reluctant to do so at this time until urinary symptoms improved.    Patient will follow-up in 4 to 6 weeks to reevaluate symptoms or sooner as needed          Other Visit Diagnoses         Codes    Benign prostatic hyperplasia, unspecified whether lower urinary tract symptoms present    -  Primary N40.0    Relevant Medications    tamsulosin (Flomax) 0.4 mg 24 hr capsule    Other Relevant Orders    PSA    POCT UA Automated manually resulted (Completed)    Measure post void residual (Completed)                 Michael Johnson PA-C 04/30/24 4:20 PM

## 2024-05-02 ENCOUNTER — PHARMACY VISIT (OUTPATIENT)
Dept: PHARMACY | Facility: CLINIC | Age: 64
End: 2024-05-02
Payer: MEDICAID

## 2024-05-03 PROCEDURE — RXMED WILLOW AMBULATORY MEDICATION CHARGE

## 2024-05-08 ENCOUNTER — PHARMACY VISIT (OUTPATIENT)
Dept: PHARMACY | Facility: CLINIC | Age: 64
End: 2024-05-08
Payer: MEDICAID

## 2024-05-17 PROCEDURE — RXMED WILLOW AMBULATORY MEDICATION CHARGE

## 2024-05-21 ENCOUNTER — PHARMACY VISIT (OUTPATIENT)
Dept: PHARMACY | Facility: CLINIC | Age: 64
End: 2024-05-21
Payer: MEDICAID

## 2024-05-28 DIAGNOSIS — M54.42 CHRONIC RIGHT-SIDED LOW BACK PAIN WITH BILATERAL SCIATICA: Chronic | ICD-10-CM

## 2024-05-28 DIAGNOSIS — G89.29 CHRONIC RIGHT-SIDED LOW BACK PAIN WITH BILATERAL SCIATICA: Chronic | ICD-10-CM

## 2024-05-28 DIAGNOSIS — M54.41 CHRONIC RIGHT-SIDED LOW BACK PAIN WITH BILATERAL SCIATICA: Chronic | ICD-10-CM

## 2024-05-28 PROCEDURE — RXMED WILLOW AMBULATORY MEDICATION CHARGE

## 2024-05-30 ENCOUNTER — PHARMACY VISIT (OUTPATIENT)
Dept: PHARMACY | Facility: CLINIC | Age: 64
End: 2024-05-30
Payer: MEDICAID

## 2024-05-30 PROCEDURE — RXMED WILLOW AMBULATORY MEDICATION CHARGE

## 2024-05-30 RX ORDER — TOPIRAMATE 25 MG/1
25 TABLET ORAL 2 TIMES DAILY
Qty: 60 TABLET | Refills: 1 | Status: SHIPPED | OUTPATIENT
Start: 2024-05-30

## 2024-05-31 ENCOUNTER — PHARMACY VISIT (OUTPATIENT)
Dept: PHARMACY | Facility: CLINIC | Age: 64
End: 2024-05-31

## 2024-06-10 PROCEDURE — RXMED WILLOW AMBULATORY MEDICATION CHARGE

## 2024-06-11 ENCOUNTER — PHARMACY VISIT (OUTPATIENT)
Dept: PHARMACY | Facility: CLINIC | Age: 64
End: 2024-06-11
Payer: MEDICAID

## 2024-06-24 PROCEDURE — RXMED WILLOW AMBULATORY MEDICATION CHARGE

## 2024-06-27 ENCOUNTER — PHARMACY VISIT (OUTPATIENT)
Dept: PHARMACY | Facility: CLINIC | Age: 64
End: 2024-06-27
Payer: MEDICAID

## 2024-07-03 PROCEDURE — RXMED WILLOW AMBULATORY MEDICATION CHARGE

## 2024-07-09 ENCOUNTER — LAB (OUTPATIENT)
Dept: LAB | Facility: LAB | Age: 64
End: 2024-07-09
Payer: COMMERCIAL

## 2024-07-09 ENCOUNTER — APPOINTMENT (OUTPATIENT)
Dept: PRIMARY CARE | Facility: CLINIC | Age: 64
End: 2024-07-09
Payer: COMMERCIAL

## 2024-07-09 ENCOUNTER — PHARMACY VISIT (OUTPATIENT)
Dept: PHARMACY | Facility: CLINIC | Age: 64
End: 2024-07-09
Payer: MEDICAID

## 2024-07-09 VITALS
WEIGHT: 315 LBS | OXYGEN SATURATION: 100 % | HEART RATE: 67 BPM | HEIGHT: 74 IN | BODY MASS INDEX: 40.43 KG/M2 | SYSTOLIC BLOOD PRESSURE: 102 MMHG | DIASTOLIC BLOOD PRESSURE: 67 MMHG

## 2024-07-09 DIAGNOSIS — E66.01 CLASS 3 SEVERE OBESITY DUE TO EXCESS CALORIES WITH SERIOUS COMORBIDITY AND BODY MASS INDEX (BMI) OF 45.0 TO 49.9 IN ADULT (MULTI): ICD-10-CM

## 2024-07-09 DIAGNOSIS — N40.0 BENIGN PROSTATIC HYPERPLASIA, UNSPECIFIED WHETHER LOWER URINARY TRACT SYMPTOMS PRESENT: ICD-10-CM

## 2024-07-09 DIAGNOSIS — H35.033 BILATERAL HYPERTENSIVE RETINOPATHY: ICD-10-CM

## 2024-07-09 DIAGNOSIS — E11.3299 BDR (BACKGROUND DIABETIC RETINOPATHY) (MULTI): ICD-10-CM

## 2024-07-09 DIAGNOSIS — Z79.4 TYPE 2 DIABETES MELLITUS WITH BOTH EYES AFFECTED BY MILD NONPROLIFERATIVE RETINOPATHY WITHOUT MACULAR EDEMA, WITH LONG-TERM CURRENT USE OF INSULIN (MULTI): ICD-10-CM

## 2024-07-09 DIAGNOSIS — I10 BENIGN ESSENTIAL HYPERTENSION: Chronic | ICD-10-CM

## 2024-07-09 DIAGNOSIS — I51.89 LEFT VENTRICULAR SYSTOLIC DYSFUNCTION, NYHA CLASS 3: ICD-10-CM

## 2024-07-09 DIAGNOSIS — I50.9 CONGESTIVE HEART FAILURE, UNSPECIFIED HF CHRONICITY, UNSPECIFIED HEART FAILURE TYPE (MULTI): ICD-10-CM

## 2024-07-09 DIAGNOSIS — Z13.89 SCREENING FOR OBESITY: ICD-10-CM

## 2024-07-09 DIAGNOSIS — R29.818 SUSPECTED SLEEP APNEA: ICD-10-CM

## 2024-07-09 DIAGNOSIS — Z79.4 TYPE 2 DIABETES MELLITUS WITH OTHER SPECIFIED COMPLICATION, WITH LONG-TERM CURRENT USE OF INSULIN (MULTI): ICD-10-CM

## 2024-07-09 DIAGNOSIS — G89.29 CHRONIC BILATERAL LOW BACK PAIN WITHOUT SCIATICA: Primary | Chronic | ICD-10-CM

## 2024-07-09 DIAGNOSIS — E11.69 TYPE 2 DIABETES MELLITUS WITH OTHER SPECIFIED COMPLICATION, WITH LONG-TERM CURRENT USE OF INSULIN (MULTI): ICD-10-CM

## 2024-07-09 DIAGNOSIS — E11.3293 TYPE 2 DIABETES MELLITUS WITH BOTH EYES AFFECTED BY MILD NONPROLIFERATIVE RETINOPATHY WITHOUT MACULAR EDEMA, WITH LONG-TERM CURRENT USE OF INSULIN (MULTI): ICD-10-CM

## 2024-07-09 DIAGNOSIS — M54.50 CHRONIC BILATERAL LOW BACK PAIN WITHOUT SCIATICA: Primary | Chronic | ICD-10-CM

## 2024-07-09 LAB
EST. AVERAGE GLUCOSE BLD GHB EST-MCNC: 200 MG/DL
HBA1C MFR BLD: 8.6 %
PSA SERPL-MCNC: 0.5 NG/ML

## 2024-07-09 PROCEDURE — 3048F LDL-C <100 MG/DL: CPT | Performed by: INTERNAL MEDICINE

## 2024-07-09 PROCEDURE — 36415 COLL VENOUS BLD VENIPUNCTURE: CPT

## 2024-07-09 PROCEDURE — 83036 HEMOGLOBIN GLYCOSYLATED A1C: CPT

## 2024-07-09 PROCEDURE — 3074F SYST BP LT 130 MM HG: CPT | Performed by: INTERNAL MEDICINE

## 2024-07-09 PROCEDURE — RXMED WILLOW AMBULATORY MEDICATION CHARGE

## 2024-07-09 PROCEDURE — 3078F DIAST BP <80 MM HG: CPT | Performed by: INTERNAL MEDICINE

## 2024-07-09 PROCEDURE — 3046F HEMOGLOBIN A1C LEVEL >9.0%: CPT | Performed by: INTERNAL MEDICINE

## 2024-07-09 PROCEDURE — 3008F BODY MASS INDEX DOCD: CPT | Performed by: INTERNAL MEDICINE

## 2024-07-09 PROCEDURE — 1036F TOBACCO NON-USER: CPT | Performed by: INTERNAL MEDICINE

## 2024-07-09 PROCEDURE — 99213 OFFICE O/P EST LOW 20 MIN: CPT | Performed by: INTERNAL MEDICINE

## 2024-07-09 PROCEDURE — 84153 ASSAY OF PSA TOTAL: CPT

## 2024-07-09 RX ORDER — METHOCARBAMOL 500 MG/1
500 TABLET, FILM COATED ORAL NIGHTLY PRN
Qty: 30 TABLET | Refills: 0 | Status: SHIPPED | OUTPATIENT
Start: 2024-07-09

## 2024-07-09 ASSESSMENT — PATIENT HEALTH QUESTIONNAIRE - PHQ9
2. FEELING DOWN, DEPRESSED OR HOPELESS: NOT AT ALL
SUM OF ALL RESPONSES TO PHQ9 QUESTIONS 1 AND 2: 0
1. LITTLE INTEREST OR PLEASURE IN DOING THINGS: NOT AT ALL

## 2024-07-09 ASSESSMENT — ENCOUNTER SYMPTOMS: CONSTITUTIONAL NEGATIVE: 1

## 2024-07-09 NOTE — LETTER
TO WHOM IT MAY CONCERN     Ref  JOSHUA GARCIA JR.   1960      DEAR /       The above named pt of mine need air conditioner   Since he has chronic disease like htn , heart disease   And diabetes         Sincerely       Crispin Rivera M.D   [address] , [address]   [place] [address]     Tel [phone]  Fax [phone]

## 2024-07-09 NOTE — PROGRESS NOTES
"Patient ID: Dallas Ruiz Jr. is a 64 y.o. male who presents for Follow-up (4 month follow up ) and Leg Swelling.    /67   Pulse 67   Ht 1.88 m (6' 2\")   Wt (!) 170 kg (374 lb)   SpO2 100%   BMI 48.02 kg/m²     HPI      Patient here for follow-up    He has diabetes type 2  On insulin, patient is seeing Dr. TANYA Salguero   At [facility], last A1c was 12  There has not been any changes in either his insulin dose  Or any oral hypoglycemic agents        Patient has diabetes type 2 with diabetic retinopathy  He has not seen ophthalmologist for 2 years  He wants to get a new ophthalmology referral  Here at [facility] location      Hypertension on medication controlled  No chest pain, no shortness of breath, no PND, no orthopnea,  No leg swelling, no headache,      He has CHF  Stable  He has been seeing cardiologist on a regular basis      He is morbidly obese  He has sleep apnea  But not being able to use CPAP machine  He is very uncomfortable with the CPAP    Patient is having low back pain  He feels he is having stiffness and spasm in the lower back  It is affecting his sleep and is affecting his daily chores      Subjective     Review of Systems   Constitutional: Negative.    All other systems reviewed and are negative.      Objective     Physical Exam  Vitals and nursing note reviewed.   Constitutional:       Appearance: He is obese.   Neck:      Vascular: No carotid bruit.   Cardiovascular:      Rate and Rhythm: Normal rate and regular rhythm.      Pulses: Normal pulses.      Heart sounds: Normal heart sounds. No murmur heard.  Pulmonary:      Effort: Pulmonary effort is normal.      Breath sounds: Normal breath sounds.   Musculoskeletal:      Right lower leg: No edema.      Left lower leg: No edema.      Comments: Lumbar flexion extension is painful  No lumbar spine tenderness  There is significant spasm over the paralumbar area  Unable to do SLR test   Skin:     Capillary Refill: Capillary refill takes " more than 3 seconds.   Neurological:      General: No focal deficit present.      Mental Status: He is oriented to person, place, and time. Mental status is at baseline.   Psychiatric:         Mood and Affect: Mood normal.         Behavior: Behavior normal.         Thought Content: Thought content normal.         Judgment: Judgment normal.         Lab Results   Component Value Date    WBC 6.1 03/08/2024    HGB 13.4 (L) 03/08/2024    HCT 42.1 03/08/2024    MCV 88 03/08/2024     03/08/2024           Problem List Items Addressed This Visit       BDR (background diabetic retinopathy) (Multi)    Benign essential hypertension    Relevant Orders    Referral to Ophthalmology    Bilateral hypertensive retinopathy    CHF (congestive heart failure) (Multi)    Left ventricular systolic dysfunction, NYHA class 3    Type 2 diabetes mellitus with mild nonproliferative diabetic retinopathy without macular edema, bilateral (Multi)    Relevant Orders    Referral to Ophthalmology    Suspected sleep apnea    Screening for obesity    Class 3 severe obesity due to excess calories with serious comorbidity and body mass index (BMI) of 45.0 to 49.9 in adult (Multi)     Other Visit Diagnoses       Chronic bilateral low back pain without sciatica  (Chronic)   -  Primary    Relevant Medications    methocarbamol (Robaxin) 500 mg tablet                 A/P        Advised to lose weight  Advised to use moist heat over the lower back  Advised to use Voltaren gel 1% to the affected area on the back  Tylenol 500 mg 1 pill 3 times a day around-the-clock  Methocarbamol 500 mg 1 pill at night quantity 30 no refill  Follow-up with me in 4 months time  Referral to ophthalmology placed  Letter written for air conditioner

## 2024-07-10 ENCOUNTER — PHARMACY VISIT (OUTPATIENT)
Dept: PHARMACY | Facility: CLINIC | Age: 64
End: 2024-07-10
Payer: MEDICAID

## 2024-07-16 ENCOUNTER — APPOINTMENT (OUTPATIENT)
Dept: UROLOGY | Facility: CLINIC | Age: 64
End: 2024-07-16
Payer: COMMERCIAL

## 2024-07-16 ENCOUNTER — HOSPITAL ENCOUNTER (OUTPATIENT)
Facility: HOSPITAL | Age: 64
Setting detail: OBSERVATION
LOS: 1 days | Discharge: HOME | End: 2024-07-19
Attending: EMERGENCY MEDICINE | Admitting: INTERNAL MEDICINE
Payer: COMMERCIAL

## 2024-07-16 VITALS
BODY MASS INDEX: 40.43 KG/M2 | TEMPERATURE: 97.1 F | HEART RATE: 93 BPM | DIASTOLIC BLOOD PRESSURE: 76 MMHG | SYSTOLIC BLOOD PRESSURE: 145 MMHG | WEIGHT: 315 LBS | HEIGHT: 74 IN

## 2024-07-16 DIAGNOSIS — K59.09 OTHER CONSTIPATION: ICD-10-CM

## 2024-07-16 DIAGNOSIS — R60.0 PEDAL EDEMA: ICD-10-CM

## 2024-07-16 DIAGNOSIS — I50.22 CHRONIC SYSTOLIC CONGESTIVE HEART FAILURE (MULTI): ICD-10-CM

## 2024-07-16 DIAGNOSIS — I20.89 STABLE ANGINA PECTORIS (CMS-HCC): ICD-10-CM

## 2024-07-16 DIAGNOSIS — I25.10 ARTERIOSCLEROTIC CARDIOVASCULAR DISEASE: ICD-10-CM

## 2024-07-16 DIAGNOSIS — I25.2 HISTORY OF MI (MYOCARDIAL INFARCTION): ICD-10-CM

## 2024-07-16 DIAGNOSIS — I10 BENIGN ESSENTIAL HYPERTENSION: ICD-10-CM

## 2024-07-16 DIAGNOSIS — R94.31 ACUTE ELECTROCARDIOGRAM CHANGES: ICD-10-CM

## 2024-07-16 DIAGNOSIS — D50.9 IRON DEFICIENCY ANEMIA, UNSPECIFIED IRON DEFICIENCY ANEMIA TYPE: ICD-10-CM

## 2024-07-16 DIAGNOSIS — I51.3 LV (LEFT VENTRICULAR) MURAL THROMBUS: ICD-10-CM

## 2024-07-16 DIAGNOSIS — R07.89 ATYPICAL CHEST PAIN: ICD-10-CM

## 2024-07-16 DIAGNOSIS — R07.9 CHEST PAIN, UNSPECIFIED TYPE: Primary | ICD-10-CM

## 2024-07-16 DIAGNOSIS — I50.40 COMBINED SYSTOLIC AND DIASTOLIC CONGESTIVE HEART FAILURE, UNSPECIFIED HF CHRONICITY (MULTI): ICD-10-CM

## 2024-07-16 DIAGNOSIS — N40.0 BENIGN PROSTATIC HYPERPLASIA, UNSPECIFIED WHETHER LOWER URINARY TRACT SYMPTOMS PRESENT: Primary | ICD-10-CM

## 2024-07-16 PROCEDURE — 1036F TOBACCO NON-USER: CPT | Performed by: PHYSICIAN ASSISTANT

## 2024-07-16 PROCEDURE — 3052F HG A1C>EQUAL 8.0%<EQUAL 9.0%: CPT | Performed by: PHYSICIAN ASSISTANT

## 2024-07-16 PROCEDURE — 99285 EMERGENCY DEPT VISIT HI MDM: CPT

## 2024-07-16 PROCEDURE — 51798 US URINE CAPACITY MEASURE: CPT | Performed by: PHYSICIAN ASSISTANT

## 2024-07-16 PROCEDURE — 3048F LDL-C <100 MG/DL: CPT | Performed by: PHYSICIAN ASSISTANT

## 2024-07-16 PROCEDURE — 3077F SYST BP >= 140 MM HG: CPT | Performed by: PHYSICIAN ASSISTANT

## 2024-07-16 PROCEDURE — RXMED WILLOW AMBULATORY MEDICATION CHARGE

## 2024-07-16 PROCEDURE — 3008F BODY MASS INDEX DOCD: CPT | Performed by: PHYSICIAN ASSISTANT

## 2024-07-16 PROCEDURE — 3078F DIAST BP <80 MM HG: CPT | Performed by: PHYSICIAN ASSISTANT

## 2024-07-16 PROCEDURE — 99214 OFFICE O/P EST MOD 30 MIN: CPT | Performed by: PHYSICIAN ASSISTANT

## 2024-07-16 RX ORDER — SOLIFENACIN SUCCINATE 10 MG/1
10 TABLET, FILM COATED ORAL DAILY
Qty: 30 TABLET | Refills: 11 | Status: SHIPPED | OUTPATIENT
Start: 2024-07-16 | End: 2025-07-16

## 2024-07-16 RX ORDER — TAMSULOSIN HYDROCHLORIDE 0.4 MG/1
0.8 CAPSULE ORAL DAILY
Qty: 180 CAPSULE | Refills: 3 | Status: SHIPPED | OUTPATIENT
Start: 2024-07-16 | End: 2025-07-16

## 2024-07-16 ASSESSMENT — ENCOUNTER SYMPTOMS
HEMATURIA: 0
DYSURIA: 0
FREQUENCY: 1
PSYCHIATRIC NEGATIVE: 1
ENDOCRINE NEGATIVE: 1
EYES NEGATIVE: 1
RESPIRATORY NEGATIVE: 1
NEUROLOGICAL NEGATIVE: 1
DIFFICULTY URINATING: 0
ALLERGIC/IMMUNOLOGIC NEGATIVE: 1
HEMATOLOGIC/LYMPHATIC NEGATIVE: 1
CARDIOVASCULAR NEGATIVE: 1
CONSTITUTIONAL NEGATIVE: 1
GASTROINTESTINAL NEGATIVE: 1

## 2024-07-16 ASSESSMENT — PAIN SCALES - GENERAL: PAINLEVEL: 0-NO PAIN

## 2024-07-16 ASSESSMENT — COLUMBIA-SUICIDE SEVERITY RATING SCALE - C-SSRS
1. IN THE PAST MONTH, HAVE YOU WISHED YOU WERE DEAD OR WISHED YOU COULD GO TO SLEEP AND NOT WAKE UP?: NO
6. HAVE YOU EVER DONE ANYTHING, STARTED TO DO ANYTHING, OR PREPARED TO DO ANYTHING TO END YOUR LIFE?: NO
2. HAVE YOU ACTUALLY HAD ANY THOUGHTS OF KILLING YOURSELF?: NO

## 2024-07-16 NOTE — PROGRESS NOTES
Subjective   Patient ID: Dallas Ruiz Jr. is a 64 y.o. male who presents for No chief complaint on file..  HPI  65 yo with BPH on Flomax 0.8mg, nephrolithiasis last seen about two years ago seen after restarting flomax.    Patient reports urinary symptoms improved after starting flomax, but he still need to void about elayne hour day and night. He reports using 4 depnds due to urgency incontinence. He reports strong urine stream. He denies straining with urination.   Patient A1C is decreasing from 12-8    Patient reports chest tightness and shortness of breath started two days ago. He reports pain happens once a day and last about 10 mins. He has a history of many MI. He states this feels different.   I reached out to his cardiologist and recommended he go to the ER.    Patient is supposed to be on Diuretics twice a day. He has not been taking them since urinary symptoms worsened over the last few months.     UA could not give a urine  PVR 54    PSA 0.5  Review of Systems   Constitutional: Negative.    HENT: Negative.     Eyes: Negative.    Respiratory: Negative.     Cardiovascular: Negative.    Gastrointestinal: Negative.    Endocrine: Negative.    Genitourinary:  Positive for frequency and urgency. Negative for difficulty urinating, dysuria and hematuria.   Skin: Negative.    Allergic/Immunologic: Negative.    Neurological: Negative.    Hematological: Negative.    Psychiatric/Behavioral: Negative.         Objective   Physical Exam  Constitutional:       General: He is not in acute distress.     Appearance: Normal appearance.   HENT:      Head: Normocephalic and atraumatic.      Nose: Nose normal.      Mouth/Throat:      Mouth: Mucous membranes are moist.   Cardiovascular:      Rate and Rhythm: Normal rate.   Pulmonary:      Effort: Pulmonary effort is normal.   Abdominal:      General: Abdomen is flat.      Palpations: Abdomen is soft.   Musculoskeletal:      Cervical back: Normal range of motion.   Neurological:       Mental Status: He is alert.         Assessment/Plan   Problem List Items Addressed This Visit    None  Visit Diagnoses         Codes    Benign prostatic hyperplasia, unspecified whether lower urinary tract symptoms present    -  Primary N40.0    Relevant Medications    solifenacin (VESIcare) 10 mg tablet    tamsulosin (Flomax) 0.4 mg 24 hr capsule    Other Relevant Orders    Measure post void residual (Completed)          Continue Flomax. I advised a trial of vesicare. I discussed possible side effects with dry mouth and constipation.     I advised patient to go to the ER immediately since his symptoms can be due to an MI. He reports that he has workers at home and will go late today. If he gets another episode of chest pain, I advised him to go immediatly to the ER.       Follow up in 2 months or sooner as needed         Michael Johnson PA-C 07/16/24 10:57 AM

## 2024-07-17 ENCOUNTER — APPOINTMENT (OUTPATIENT)
Dept: RADIOLOGY | Facility: HOSPITAL | Age: 64
End: 2024-07-17
Payer: COMMERCIAL

## 2024-07-17 ENCOUNTER — CLINICAL SUPPORT (OUTPATIENT)
Dept: EMERGENCY MEDICINE | Facility: HOSPITAL | Age: 64
End: 2024-07-17
Payer: COMMERCIAL

## 2024-07-17 ENCOUNTER — HOSPITAL ENCOUNTER (OUTPATIENT)
Dept: CARDIOLOGY | Facility: HOSPITAL | Age: 64
Discharge: HOME | End: 2024-07-17
Payer: COMMERCIAL

## 2024-07-17 PROBLEM — R07.9 CHEST PAIN, UNSPECIFIED TYPE: Status: ACTIVE | Noted: 2024-07-17

## 2024-07-17 LAB
ALBUMIN SERPL BCP-MCNC: 3.5 G/DL (ref 3.4–5)
ALBUMIN SERPL BCP-MCNC: 3.6 G/DL (ref 3.4–5)
ALBUMIN SERPL BCP-MCNC: 3.9 G/DL (ref 3.4–5)
ALP SERPL-CCNC: 73 U/L (ref 33–136)
ALT SERPL W P-5'-P-CCNC: 18 U/L (ref 10–52)
ANION GAP SERPL CALC-SCNC: 13 MMOL/L (ref 10–20)
ANION GAP SERPL CALC-SCNC: 15 MMOL/L (ref 10–20)
ANION GAP SERPL CALC-SCNC: 15 MMOL/L (ref 10–20)
AST SERPL W P-5'-P-CCNC: 21 U/L (ref 9–39)
ATRIAL RATE: 77 BPM
BASOPHILS # BLD AUTO: 0.01 X10*3/UL (ref 0–0.1)
BASOPHILS # BLD AUTO: 0.02 X10*3/UL (ref 0–0.1)
BASOPHILS NFR BLD AUTO: 0.2 %
BASOPHILS NFR BLD AUTO: 0.3 %
BILIRUB SERPL-MCNC: 0.4 MG/DL (ref 0–1.2)
BNP SERPL-MCNC: 13 PG/ML (ref 0–99)
BUN SERPL-MCNC: 16 MG/DL (ref 6–23)
CALCIUM SERPL-MCNC: 8.9 MG/DL (ref 8.6–10.6)
CALCIUM SERPL-MCNC: 9 MG/DL (ref 8.6–10.6)
CALCIUM SERPL-MCNC: 9.4 MG/DL (ref 8.6–10.6)
CARDIAC TROPONIN I PNL SERPL HS: 11 NG/L (ref 0–53)
CARDIAC TROPONIN I PNL SERPL HS: 12 NG/L (ref 0–53)
CHLORIDE SERPL-SCNC: 102 MMOL/L (ref 98–107)
CHLORIDE SERPL-SCNC: 104 MMOL/L (ref 98–107)
CHLORIDE SERPL-SCNC: 107 MMOL/L (ref 98–107)
CHOLEST SERPL-MCNC: 122 MG/DL (ref 0–199)
CHOLESTEROL/HDL RATIO: 4
CO2 SERPL-SCNC: 25 MMOL/L (ref 21–32)
CO2 SERPL-SCNC: 25 MMOL/L (ref 21–32)
CO2 SERPL-SCNC: 27 MMOL/L (ref 21–32)
CREAT SERPL-MCNC: 1.03 MG/DL (ref 0.5–1.3)
CREAT SERPL-MCNC: 1.12 MG/DL (ref 0.5–1.3)
CREAT SERPL-MCNC: 1.19 MG/DL (ref 0.5–1.3)
EGFRCR SERPLBLD CKD-EPI 2021: 69 ML/MIN/1.73M*2
EGFRCR SERPLBLD CKD-EPI 2021: 74 ML/MIN/1.73M*2
EGFRCR SERPLBLD CKD-EPI 2021: 82 ML/MIN/1.73M*2
EOSINOPHIL # BLD AUTO: 0.09 X10*3/UL (ref 0–0.7)
EOSINOPHIL # BLD AUTO: 0.1 X10*3/UL (ref 0–0.7)
EOSINOPHIL NFR BLD AUTO: 1.3 %
EOSINOPHIL NFR BLD AUTO: 1.6 %
ERYTHROCYTE [DISTWIDTH] IN BLOOD BY AUTOMATED COUNT: 14.3 % (ref 11.5–14.5)
ERYTHROCYTE [DISTWIDTH] IN BLOOD BY AUTOMATED COUNT: 14.3 % (ref 11.5–14.5)
ERYTHROCYTE [DISTWIDTH] IN BLOOD BY AUTOMATED COUNT: 14.4 % (ref 11.5–14.5)
GLUCOSE BLD MANUAL STRIP-MCNC: 179 MG/DL (ref 74–99)
GLUCOSE BLD MANUAL STRIP-MCNC: 212 MG/DL (ref 74–99)
GLUCOSE BLD MANUAL STRIP-MCNC: 73 MG/DL (ref 74–99)
GLUCOSE BLD MANUAL STRIP-MCNC: 90 MG/DL (ref 74–99)
GLUCOSE BLD MANUAL STRIP-MCNC: 99 MG/DL (ref 74–99)
GLUCOSE SERPL-MCNC: 107 MG/DL (ref 74–99)
GLUCOSE SERPL-MCNC: 224 MG/DL (ref 74–99)
GLUCOSE SERPL-MCNC: 72 MG/DL (ref 74–99)
HCT VFR BLD AUTO: 38.9 % (ref 41–52)
HCT VFR BLD AUTO: 39.1 % (ref 41–52)
HCT VFR BLD AUTO: 39.9 % (ref 41–52)
HDLC SERPL-MCNC: 30.8 MG/DL
HGB BLD-MCNC: 12.4 G/DL (ref 13.5–17.5)
HGB BLD-MCNC: 12.7 G/DL (ref 13.5–17.5)
HGB BLD-MCNC: 13 G/DL (ref 13.5–17.5)
HOLD SPECIMEN: NORMAL
IMM GRANULOCYTES # BLD AUTO: 0.01 X10*3/UL (ref 0–0.7)
IMM GRANULOCYTES # BLD AUTO: 0.01 X10*3/UL (ref 0–0.7)
IMM GRANULOCYTES NFR BLD AUTO: 0.1 % (ref 0–0.9)
IMM GRANULOCYTES NFR BLD AUTO: 0.2 % (ref 0–0.9)
LDLC SERPL CALC-MCNC: 54 MG/DL
LYMPHOCYTES # BLD AUTO: 2.28 X10*3/UL (ref 1.2–4.8)
LYMPHOCYTES # BLD AUTO: 2.53 X10*3/UL (ref 1.2–4.8)
LYMPHOCYTES NFR BLD AUTO: 36.2 %
LYMPHOCYTES NFR BLD AUTO: 37.4 %
MAGNESIUM SERPL-MCNC: 1.84 MG/DL (ref 1.6–2.4)
MAGNESIUM SERPL-MCNC: 2 MG/DL (ref 1.6–2.4)
MAGNESIUM SERPL-MCNC: 2.2 MG/DL (ref 1.6–2.4)
MCH RBC QN AUTO: 27.4 PG (ref 26–34)
MCH RBC QN AUTO: 27.6 PG (ref 26–34)
MCH RBC QN AUTO: 27.8 PG (ref 26–34)
MCHC RBC AUTO-ENTMCNC: 31.7 G/DL (ref 32–36)
MCHC RBC AUTO-ENTMCNC: 31.8 G/DL (ref 32–36)
MCHC RBC AUTO-ENTMCNC: 33.4 G/DL (ref 32–36)
MCV RBC AUTO: 83 FL (ref 80–100)
MCV RBC AUTO: 86 FL (ref 80–100)
MCV RBC AUTO: 87 FL (ref 80–100)
MONOCYTES # BLD AUTO: 0.52 X10*3/UL (ref 0.1–1)
MONOCYTES # BLD AUTO: 0.61 X10*3/UL (ref 0.1–1)
MONOCYTES NFR BLD AUTO: 8.3 %
MONOCYTES NFR BLD AUTO: 9 %
NEUTROPHILS # BLD AUTO: 3.38 X10*3/UL (ref 1.2–7.7)
NEUTROPHILS # BLD AUTO: 3.51 X10*3/UL (ref 1.2–7.7)
NEUTROPHILS NFR BLD AUTO: 51.9 %
NEUTROPHILS NFR BLD AUTO: 53.5 %
NON HDL CHOLESTEROL: 91 MG/DL (ref 0–149)
NRBC BLD-RTO: 0 /100 WBCS (ref 0–0)
P AXIS: 67 DEGREES
P OFFSET: 183 MS
P ONSET: 122 MS
PHOSPHATE SERPL-MCNC: 4.5 MG/DL (ref 2.5–4.9)
PHOSPHATE SERPL-MCNC: 4.5 MG/DL (ref 2.5–4.9)
PLATELET # BLD AUTO: 171 X10*3/UL (ref 150–450)
PLATELET # BLD AUTO: 200 X10*3/UL (ref 150–450)
PLATELET # BLD AUTO: 207 X10*3/UL (ref 150–450)
POTASSIUM SERPL-SCNC: 3.8 MMOL/L (ref 3.5–5.3)
POTASSIUM SERPL-SCNC: 4 MMOL/L (ref 3.5–5.3)
POTASSIUM SERPL-SCNC: 4.5 MMOL/L (ref 3.5–5.3)
PR INTERVAL: 190 MS
PROT SERPL-MCNC: 6.8 G/DL (ref 6.4–8.2)
Q ONSET: 217 MS
QRS COUNT: 13 BEATS
QRS DURATION: 80 MS
QT INTERVAL: 356 MS
QTC CALCULATION(BAZETT): 402 MS
QTC FREDERICIA: 386 MS
R AXIS: -13 DEGREES
RBC # BLD AUTO: 4.49 X10*6/UL (ref 4.5–5.9)
RBC # BLD AUTO: 4.63 X10*6/UL (ref 4.5–5.9)
RBC # BLD AUTO: 4.68 X10*6/UL (ref 4.5–5.9)
SODIUM SERPL-SCNC: 137 MMOL/L (ref 136–145)
SODIUM SERPL-SCNC: 141 MMOL/L (ref 136–145)
SODIUM SERPL-SCNC: 142 MMOL/L (ref 136–145)
T AXIS: 80 DEGREES
T OFFSET: 395 MS
TRIGL SERPL-MCNC: 186 MG/DL (ref 0–149)
VENTRICULAR RATE: 77 BPM
VLDL: 37 MG/DL (ref 0–40)
WBC # BLD AUTO: 6.3 X10*3/UL (ref 4.4–11.3)
WBC # BLD AUTO: 6.8 X10*3/UL (ref 4.4–11.3)
WBC # BLD AUTO: 7.6 X10*3/UL (ref 4.4–11.3)

## 2024-07-17 PROCEDURE — 82947 ASSAY GLUCOSE BLOOD QUANT: CPT

## 2024-07-17 PROCEDURE — 83718 ASSAY OF LIPOPROTEIN: CPT

## 2024-07-17 PROCEDURE — 85027 COMPLETE CBC AUTOMATED: CPT | Mod: 59 | Performed by: NURSE PRACTITIONER

## 2024-07-17 PROCEDURE — 2500000001 HC RX 250 WO HCPCS SELF ADMINISTERED DRUGS (ALT 637 FOR MEDICARE OP): Performed by: NURSE PRACTITIONER

## 2024-07-17 PROCEDURE — 80069 RENAL FUNCTION PANEL: CPT | Mod: CCI | Performed by: NURSE PRACTITIONER

## 2024-07-17 PROCEDURE — A9575 INJ GADOTERATE MEGLUMI 0.1ML: HCPCS | Performed by: NURSE PRACTITIONER

## 2024-07-17 PROCEDURE — 85025 COMPLETE CBC W/AUTO DIFF WBC: CPT

## 2024-07-17 PROCEDURE — 99223 1ST HOSP IP/OBS HIGH 75: CPT | Performed by: INTERNAL MEDICINE

## 2024-07-17 PROCEDURE — 96374 THER/PROPH/DIAG INJ IV PUSH: CPT

## 2024-07-17 PROCEDURE — 71045 X-RAY EXAM CHEST 1 VIEW: CPT | Mod: FOREIGN READ | Performed by: RADIOLOGY

## 2024-07-17 PROCEDURE — 1200000002 HC GENERAL ROOM WITH TELEMETRY DAILY

## 2024-07-17 PROCEDURE — 80053 COMPREHEN METABOLIC PANEL: CPT

## 2024-07-17 PROCEDURE — 75563 CARD MRI W/STRESS IMG & DYE: CPT | Performed by: STUDENT IN AN ORGANIZED HEALTH CARE EDUCATION/TRAINING PROGRAM

## 2024-07-17 PROCEDURE — 75563 CARD MRI W/STRESS IMG & DYE: CPT

## 2024-07-17 PROCEDURE — 80069 RENAL FUNCTION PANEL: CPT | Mod: CCI

## 2024-07-17 PROCEDURE — 2500000004 HC RX 250 GENERAL PHARMACY W/ HCPCS (ALT 636 FOR OP/ED)

## 2024-07-17 PROCEDURE — 93010 ELECTROCARDIOGRAM REPORT: CPT | Performed by: INTERNAL MEDICINE

## 2024-07-17 PROCEDURE — 2500000002 HC RX 250 W HCPCS SELF ADMINISTERED DRUGS (ALT 637 FOR MEDICARE OP, ALT 636 FOR OP/ED): Performed by: NURSE PRACTITIONER

## 2024-07-17 PROCEDURE — 2500000002 HC RX 250 W HCPCS SELF ADMINISTERED DRUGS (ALT 637 FOR MEDICARE OP, ALT 636 FOR OP/ED)

## 2024-07-17 PROCEDURE — 83735 ASSAY OF MAGNESIUM: CPT

## 2024-07-17 PROCEDURE — 2550000001 HC RX 255 CONTRASTS: Performed by: NURSE PRACTITIONER

## 2024-07-17 PROCEDURE — 2500000004 HC RX 250 GENERAL PHARMACY W/ HCPCS (ALT 636 FOR OP/ED): Performed by: NURSE PRACTITIONER

## 2024-07-17 PROCEDURE — 36415 COLL VENOUS BLD VENIPUNCTURE: CPT

## 2024-07-17 PROCEDURE — 84484 ASSAY OF TROPONIN QUANT: CPT

## 2024-07-17 PROCEDURE — 93005 ELECTROCARDIOGRAM TRACING: CPT

## 2024-07-17 PROCEDURE — 83880 ASSAY OF NATRIURETIC PEPTIDE: CPT

## 2024-07-17 PROCEDURE — 71045 X-RAY EXAM CHEST 1 VIEW: CPT

## 2024-07-17 PROCEDURE — 2500000001 HC RX 250 WO HCPCS SELF ADMINISTERED DRUGS (ALT 637 FOR MEDICARE OP)

## 2024-07-17 PROCEDURE — 2500000001 HC RX 250 WO HCPCS SELF ADMINISTERED DRUGS (ALT 637 FOR MEDICARE OP): Mod: SE

## 2024-07-17 PROCEDURE — 83735 ASSAY OF MAGNESIUM: CPT | Performed by: NURSE PRACTITIONER

## 2024-07-17 PROCEDURE — 96375 TX/PRO/DX INJ NEW DRUG ADDON: CPT

## 2024-07-17 RX ORDER — METOPROLOL SUCCINATE 50 MG/1
50 TABLET, EXTENDED RELEASE ORAL DAILY
Status: DISCONTINUED | OUTPATIENT
Start: 2024-07-17 | End: 2024-07-19 | Stop reason: HOSPADM

## 2024-07-17 RX ORDER — NAPROXEN SODIUM 220 MG/1
324 TABLET, FILM COATED ORAL ONCE
Status: COMPLETED | OUTPATIENT
Start: 2024-07-17 | End: 2024-07-17

## 2024-07-17 RX ORDER — MAGNESIUM SULFATE HEPTAHYDRATE 40 MG/ML
2 INJECTION, SOLUTION INTRAVENOUS ONCE
Status: COMPLETED | OUTPATIENT
Start: 2024-07-17 | End: 2024-07-17

## 2024-07-17 RX ORDER — ATORVASTATIN CALCIUM 40 MG/1
40 TABLET, FILM COATED ORAL NIGHTLY
Status: DISCONTINUED | OUTPATIENT
Start: 2024-07-17 | End: 2024-07-19 | Stop reason: HOSPADM

## 2024-07-17 RX ORDER — INSULIN LISPRO 100 [IU]/ML
0-5 INJECTION, SOLUTION INTRAVENOUS; SUBCUTANEOUS
Status: DISCONTINUED | OUTPATIENT
Start: 2024-07-17 | End: 2024-07-19 | Stop reason: HOSPADM

## 2024-07-17 RX ORDER — TAMSULOSIN HYDROCHLORIDE 0.4 MG/1
0.8 CAPSULE ORAL DAILY
Status: DISCONTINUED | OUTPATIENT
Start: 2024-07-17 | End: 2024-07-19 | Stop reason: HOSPADM

## 2024-07-17 RX ORDER — FUROSEMIDE 40 MG/1
40 TABLET ORAL DAILY
Status: DISCONTINUED | OUTPATIENT
Start: 2024-07-17 | End: 2024-07-19 | Stop reason: HOSPADM

## 2024-07-17 RX ORDER — SENNOSIDES 8.6 MG/1
2 TABLET ORAL 2 TIMES DAILY
Status: DISCONTINUED | OUTPATIENT
Start: 2024-07-17 | End: 2024-07-19 | Stop reason: HOSPADM

## 2024-07-17 RX ORDER — FERROUS SULFATE 325(65) MG
1 TABLET ORAL
Status: DISCONTINUED | OUTPATIENT
Start: 2024-07-17 | End: 2024-07-19 | Stop reason: HOSPADM

## 2024-07-17 RX ORDER — ATORVASTATIN CALCIUM 80 MG/1
80 TABLET, FILM COATED ORAL NIGHTLY
Status: DISCONTINUED | OUTPATIENT
Start: 2024-07-17 | End: 2024-07-17

## 2024-07-17 RX ORDER — AMLODIPINE BESYLATE 10 MG/1
10 TABLET ORAL DAILY
Status: DISCONTINUED | OUTPATIENT
Start: 2024-07-17 | End: 2024-07-19 | Stop reason: HOSPADM

## 2024-07-17 RX ORDER — METHOCARBAMOL 500 MG/1
500 TABLET, FILM COATED ORAL NIGHTLY PRN
Status: DISCONTINUED | OUTPATIENT
Start: 2024-07-17 | End: 2024-07-19 | Stop reason: HOSPADM

## 2024-07-17 RX ORDER — DEXTROSE 50 % IN WATER (D50W) INTRAVENOUS SYRINGE
12.5
Status: DISCONTINUED | OUTPATIENT
Start: 2024-07-17 | End: 2024-07-19 | Stop reason: HOSPADM

## 2024-07-17 RX ORDER — ASPIRIN 81 MG/1
81 TABLET ORAL DAILY
Status: DISCONTINUED | OUTPATIENT
Start: 2024-07-17 | End: 2024-07-19 | Stop reason: HOSPADM

## 2024-07-17 RX ORDER — GADOTERATE MEGLUMINE 376.9 MG/ML
40 INJECTION INTRAVENOUS
Status: COMPLETED | OUTPATIENT
Start: 2024-07-17 | End: 2024-07-17

## 2024-07-17 RX ORDER — POTASSIUM CHLORIDE 20 MEQ/1
20 TABLET, EXTENDED RELEASE ORAL ONCE
Status: COMPLETED | OUTPATIENT
Start: 2024-07-17 | End: 2024-07-17

## 2024-07-17 RX ORDER — MONTELUKAST SODIUM 10 MG/1
10 TABLET ORAL DAILY
Status: DISCONTINUED | OUTPATIENT
Start: 2024-07-17 | End: 2024-07-19 | Stop reason: HOSPADM

## 2024-07-17 RX ORDER — POLYETHYLENE GLYCOL 3350 17 G/17G
17 POWDER, FOR SOLUTION ORAL DAILY
Status: DISCONTINUED | OUTPATIENT
Start: 2024-07-17 | End: 2024-07-19 | Stop reason: HOSPADM

## 2024-07-17 RX ORDER — ATORVASTATIN CALCIUM 40 MG/1
40 TABLET, FILM COATED ORAL NIGHTLY
Status: DISCONTINUED | OUTPATIENT
Start: 2024-07-17 | End: 2024-07-17

## 2024-07-17 RX ORDER — NITROGLYCERIN 0.4 MG/1
0.4 TABLET SUBLINGUAL EVERY 5 MIN PRN
Status: DISCONTINUED | OUTPATIENT
Start: 2024-07-17 | End: 2024-07-19 | Stop reason: HOSPADM

## 2024-07-17 RX ORDER — LORAZEPAM 2 MG/ML
0.5 INJECTION INTRAMUSCULAR ONCE
Status: COMPLETED | OUTPATIENT
Start: 2024-07-17 | End: 2024-07-17

## 2024-07-17 RX ORDER — ENOXAPARIN SODIUM 100 MG/ML
40 INJECTION SUBCUTANEOUS EVERY 12 HOURS SCHEDULED
Status: DISCONTINUED | OUTPATIENT
Start: 2024-07-17 | End: 2024-07-18

## 2024-07-17 RX ORDER — ACETAMINOPHEN 325 MG/1
650 TABLET ORAL EVERY 6 HOURS PRN
Status: DISCONTINUED | OUTPATIENT
Start: 2024-07-17 | End: 2024-07-19 | Stop reason: HOSPADM

## 2024-07-17 RX ORDER — DEXTROSE 50 % IN WATER (D50W) INTRAVENOUS SYRINGE
25
Status: DISCONTINUED | OUTPATIENT
Start: 2024-07-17 | End: 2024-07-19 | Stop reason: HOSPADM

## 2024-07-17 RX ORDER — ALBUTEROL SULFATE 0.83 MG/ML
2.5 SOLUTION RESPIRATORY (INHALATION) EVERY 6 HOURS PRN
Status: DISCONTINUED | OUTPATIENT
Start: 2024-07-17 | End: 2024-07-19 | Stop reason: HOSPADM

## 2024-07-17 RX ORDER — OXYBUTYNIN CHLORIDE 5 MG/1
5 TABLET ORAL 3 TIMES DAILY
Status: DISCONTINUED | OUTPATIENT
Start: 2024-07-17 | End: 2024-07-19 | Stop reason: HOSPADM

## 2024-07-17 SDOH — SOCIAL STABILITY: SOCIAL INSECURITY: DOES ANYONE TRY TO KEEP YOU FROM HAVING/CONTACTING OTHER FRIENDS OR DOING THINGS OUTSIDE YOUR HOME?: NO

## 2024-07-17 SDOH — SOCIAL STABILITY: SOCIAL INSECURITY: HAS ANYONE EVER THREATENED TO HURT YOUR FAMILY OR YOUR PETS?: NO

## 2024-07-17 SDOH — SOCIAL STABILITY: SOCIAL INSECURITY: DO YOU FEEL ANYONE HAS EXPLOITED OR TAKEN ADVANTAGE OF YOU FINANCIALLY OR OF YOUR PERSONAL PROPERTY?: NO

## 2024-07-17 SDOH — SOCIAL STABILITY: SOCIAL INSECURITY: DO YOU FEEL UNSAFE GOING BACK TO THE PLACE WHERE YOU ARE LIVING?: NO

## 2024-07-17 SDOH — SOCIAL STABILITY: SOCIAL INSECURITY: ABUSE: ADULT

## 2024-07-17 SDOH — SOCIAL STABILITY: SOCIAL INSECURITY: WERE YOU ABLE TO COMPLETE ALL THE BEHAVIORAL HEALTH SCREENINGS?: YES

## 2024-07-17 SDOH — SOCIAL STABILITY: SOCIAL INSECURITY: ARE YOU OR HAVE YOU BEEN THREATENED OR ABUSED PHYSICALLY, EMOTIONALLY, OR SEXUALLY BY ANYONE?: NO

## 2024-07-17 SDOH — SOCIAL STABILITY: SOCIAL INSECURITY: ARE THERE ANY APPARENT SIGNS OF INJURIES/BEHAVIORS THAT COULD BE RELATED TO ABUSE/NEGLECT?: NO

## 2024-07-17 SDOH — SOCIAL STABILITY: SOCIAL INSECURITY: HAVE YOU HAD THOUGHTS OF HARMING ANYONE ELSE?: NO

## 2024-07-17 SDOH — SOCIAL STABILITY: SOCIAL INSECURITY: HAVE YOU HAD ANY THOUGHTS OF HARMING ANYONE ELSE?: NO

## 2024-07-17 ASSESSMENT — LIFESTYLE VARIABLES
PRESCIPTION_ABUSE_PAST_12_MONTHS: NO
AUDIT-C TOTAL SCORE: 0
HOW MANY STANDARD DRINKS CONTAINING ALCOHOL DO YOU HAVE ON A TYPICAL DAY: PATIENT DOES NOT DRINK
AUDIT-C TOTAL SCORE: 0
SKIP TO QUESTIONS 9-10: 1
HOW OFTEN DO YOU HAVE 6 OR MORE DRINKS ON ONE OCCASION: NEVER
SUBSTANCE_ABUSE_PAST_12_MONTHS: NO
HOW OFTEN DO YOU HAVE A DRINK CONTAINING ALCOHOL: NEVER

## 2024-07-17 ASSESSMENT — ACTIVITIES OF DAILY LIVING (ADL)
PATIENT'S MEMORY ADEQUATE TO SAFELY COMPLETE DAILY ACTIVITIES?: YES
BATHING: INDEPENDENT
LACK_OF_TRANSPORTATION: NO
TOILETING: INDEPENDENT
FEEDING YOURSELF: INDEPENDENT
ADEQUATE_TO_COMPLETE_ADL: YES
HEARING - LEFT EAR: FUNCTIONAL
JUDGMENT_ADEQUATE_SAFELY_COMPLETE_DAILY_ACTIVITIES: YES
WALKS IN HOME: INDEPENDENT
GROOMING: INDEPENDENT
DRESSING YOURSELF: INDEPENDENT
HEARING - RIGHT EAR: FUNCTIONAL
ASSISTIVE_DEVICE: CANE;WALKER

## 2024-07-17 ASSESSMENT — PATIENT HEALTH QUESTIONNAIRE - PHQ9
1. LITTLE INTEREST OR PLEASURE IN DOING THINGS: NOT AT ALL
2. FEELING DOWN, DEPRESSED OR HOPELESS: NOT AT ALL
SUM OF ALL RESPONSES TO PHQ9 QUESTIONS 1 & 2: 0

## 2024-07-17 ASSESSMENT — HEART SCORE
ECG: NON-SPECIFIC REPOLARIZATION DISTURBANCE
HISTORY: SLIGHTLY SUSPICIOUS
TROPONIN: LESS THAN OR EQUAL TO NORMAL LIMIT
HEART SCORE: 4
AGE: 45-64
RISK FACTORS: >2 RISK FACTORS OR HX OF ATHEROSCLEROTIC DISEASE

## 2024-07-17 ASSESSMENT — PAIN SCALES - GENERAL
PAINLEVEL_OUTOF10: 0 - NO PAIN
PAINLEVEL_OUTOF10: 0 - NO PAIN

## 2024-07-17 ASSESSMENT — PAIN SCALES - WONG BAKER: WONGBAKER_NUMERICALRESPONSE: NO HURT

## 2024-07-17 ASSESSMENT — PAIN - FUNCTIONAL ASSESSMENT: PAIN_FUNCTIONAL_ASSESSMENT: 0-10

## 2024-07-17 NOTE — ED PROVIDER NOTES
Emergency Department Provider Note        History of Present Illness     History provided by: Patient  Limitations to History: None  External Records Reviewed with Brief Summary:  prior ED and hospitalist notes    HPI:  Patient is a 64-year-old male with past medical history of MI status post 9 cardiac stents, type 2 diabetes on insulin, CHF, BPH, PTSD presenting due to chest pain for the past day.  Patient reports that he has largely been compliant with his medications except for his aspirin and Brilinta that he did not take today.  Patient reports that he ran out of his aspirin yesterday.  He also did not take his Lasix today.  Patient does not appear significantly volume overloaded and lung examination is clear to auscultation.  Patient reports that he has chest pain that started at rest and then continued.  At worst it was 10/10 and currently is a 6/10.  His left chest pain radiates to his left shoulder without any radiation to the back or abdomen.  When it was at its worst he had associated diaphoresis.  Patient denies any nausea or vomiting, fevers or chills, shortness of breath and does not have any noted pitting edema.    Physical Exam   Triage vitals:  T 36.3 °C (97.4 °F)  HR 83  /70  RR 20  O2 96 % None (Room air)    Physical Exam  Vitals and nursing note reviewed.   Constitutional:       General: He is not in acute distress.     Appearance: He is obese.   HENT:      Head: Normocephalic and atraumatic.   Eyes:      Conjunctiva/sclera: Conjunctivae normal.   Cardiovascular:      Rate and Rhythm: Normal rate and regular rhythm.      Pulses:           Radial pulses are 2+ on the right side and 2+ on the left side.        Posterior tibial pulses are 2+ on the right side and 2+ on the left side.      Heart sounds: No murmur heard.  Pulmonary:      Effort: Pulmonary effort is normal. No respiratory distress.      Breath sounds: Normal breath sounds.   Chest:      Chest wall: No tenderness or crepitus.    Abdominal:      Palpations: Abdomen is soft.      Tenderness: There is no abdominal tenderness. There is no guarding or rebound.   Musculoskeletal:         General: No swelling.      Cervical back: Neck supple.      Right lower leg: No edema.      Left lower leg: No edema.   Skin:     General: Skin is warm and dry.      Capillary Refill: Capillary refill takes less than 2 seconds.   Neurological:      Mental Status: He is alert.   Psychiatric:         Mood and Affect: Mood normal.          Medical Decision Making & ED Course   Medical Decision Making:  Patient is a 64-year-old male with past medical history of MI status post 9 cardiac stents, type 2 diabetes, CHF, BPH, PTSD presenting due to chest pain for the past 24 hours.  Based on the patient's history and physical examination broad workup was initiated.  Initial EKG showed possible concerns of biphasic T waves in V2 however patient has had similar morphology in the past.  He does report that this feels like his previous heart attacks and given the fact that he did not take aspirin, was given 324 of aspirin while in the emergency department.  Chest x-ray appears largely unremarkable without any obvious signs of pneumonia.  Patient continues to have chest pain at this time which is why Wellens syndrome while in the differential is slightly less so.  I do not believe patient needs the Cath Lab at this time.  Repeat EKGs appeared stable however given high risk heart score and concerning story and EKG changes noted compared to most recent EKGs obtained, decision to admit to cardiac service for provocative testing and restratification was made.  Patient is comfortable with this plan patient care was overseen by attending physician agrees with plan and disposition.    Scoring Tools Utilized: HEART Score: 4       Differential diagnoses considered include but are not limited to: PE, ACS, valvulopathy, acute decompensated heart failure, valvulopathy, tamponade,  pneumothorax, pneumonia     Social Determinants of Health which Significantly Impact Care:  Poor health literacy  The following actions were taken to address these social determinants: Admission with education    EKG Independent Interpretation: The EKG obtained at 2335 was independently interpreted by myself. It demonstrates sinus rhythm with a rate of 75.  No MO or QRS prolongation.  Normal axis without any ST elevations noted.  Biphasic T waves noted in lead V2.  Changed T wave morphology when compared to prior EKG    Independent Result Review and Interpretation: Relevant laboratory and radiographic results were reviewed and independently interpreted by myself.  As necessary, they are commented on in the ED Course.    Chronic conditions affecting the patient's care: As documented above in [facility]    The patient was discussed with the following consultants/services: Admission Coordinator who accepted the patient for admission    Care Considerations: As documented above in [facility]    ED Course:  ED Course as of 07/17/24 0358 Wed Jul 17, 2024   0344 ED Attending Attestation:   64-year-old male who has history of CAD status post multiple PCI, CHF, diabetes who presents with chest pain.  Has been intermittently taking his medications. EKG showed potential concern for biphasic T waves in V2 but this was less pronounced on subsequent EKGs.  Troponin x 2 negative.  Will admit to medicine given his heart score of 4, too high risk for CDU.  - Damian Lindo MD, MPH  ED Attending  [KF]      ED Course User Index  [KF] Damian Lindo MD MPH         Diagnoses as of 07/17/24 0358   Chest pain, unspecified type   History of MI (myocardial infarction)   Acute electrocardiogram changes     Disposition   As a result of their workup, the patient will require admission to the hospital.  The patient was informed of his diagnosis.  The patient was given the opportunity to ask questions and I answered them. The patient agreed to be admitted to  the [facility].    Procedures   Procedures    Patient seen and discussed with ED attending physician.    Albertina Honeycutt MD  Emergency Medicine       Albertina Honeycutt MD  Resident  07/17/24 [id number]

## 2024-07-17 NOTE — CARE PLAN
The patient's goals for the shift include      The clinical goals for the shift include keep pt spo2 above 92%

## 2024-07-17 NOTE — NURSING NOTE
MRI STRESS        Stress protocol: Regadenoson  Regadenoson Dose: 0.4mg  Aminophylline Dose: 100mg     Resting Vitals:  BP: 111/86  HR: 63 bpm  SPO2: 93% RA  Resting ECG: Sinus rhythm, 1st AV block, poor R wave progression     Stress:  0.4mg IV push Regadenoson:  1 min: /63  HR 65 bpm 93% RA Symptoms: “a little tightness”  2 min: BP 81/58  HR 73 bpm 93% RA Symptoms: “have to go to the bathroom”     Reversal/Recovery:  100mg IV push Aminophylline:  1 min: BP 99/65  HR 77 bpm 96% RA Symptoms: Denies  2 min: /85 HR 93 bpm 96% RA Symptoms: Denies  4 min: /81  HR 78 bpm 90% RA Symptoms: Denies  6 min: /62  HR 71 bpm 96% RA Symptoms: Denies     Patients resting HR of 63 bpm leigh ann to a maximum of 93 bpm.  Resting BP of 111/86 leigh ann to a maximum of 160/85, which occurred 2 minutes into recovery. Patient transported back to floor upon completion of exam.

## 2024-07-17 NOTE — SIGNIFICANT EVENT
Subjective Data:  Patient denies CP/SOB at rest, expresses that he will typically feel chest pressure radiating to his left side with ambulation.      - stress reg cMRI today & CANCEL TTE    Overnight Events:    Admitted from ED to [facility] ([facility]) overnight with chest pain.    Assessment and Plan:  Physical Exam:  General: NAD, lying in bed  Skin: warm and dry throughout  Head/ neck: unable to appreciate JVD 2/2 body habitus   Cardiac: RRR, S1, S2   Pulm: CTAB, room air   GI: obese, non-tender  Extremities: trace, non-pitting BLE edema   Neuro: no focal neuro deficits, visually impaired (baseline), a+ox4  Psych: somewhat flat affect, appropriate mood and behavior       Dallas Ruiz Jr. is a 64 y.o. male presenting with past medical history CAD (s/p SILVINO to distal RCA in 3/2013 and proximal RCA in 7/2014, prox and n=mid LAD in 10/2014, PCI to Lcx 2018, reportedly 9 stents), ischemic cardiomyopathy w/ HFrecEF HF (EF 25-30% and diastolic dysfx, Recovered to 50-55% 2023), T2DM on insulin last hgb A1c 8.6% (7/2024), HTN, HLD who is admitted for chest pain with exertion concerning for angina. Under [facility] Service for further management.      Angina  CAD s/p multiple PCI to LAD, RCA, Lcx (s/p SIVLINO to distal RCA in 3/2013 and proximal RCA in 7/2014, prox and mid LAD in 10/2014, PCI to Lcx 2018)  #Ischemic cardiomyopathy, HFrecEF (reportedly previously 25-30%, LVEF 50-55 2/2023)  - Primary cardiologist: Dr. Danny Drew  - patient presenting with CP on exertion improving w/ rest concerning for angina  - upon further discussion, patient reports this chest discomfort has been ongoing for quite some time and it will prevent him from performing his usual ADL's maybe ~once/month, if that--->currently denies s/sx of angina  - HS Troponin 11->12  - in ED, possible Wellens sign type A in V2 on ECG new compared to previous   - s/p asa [id number] in ED  - stress reg cMRI today for ischemic evaluation   - cont. home ticagrelor 90mg BID and asa  81 mg daily (unclear why on DAPT, possible due to significant disease? Continued upon last OP visit with primary cards)  - cont. Statin 40mg nightly and metop succ 50mg daily   - cont. 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 Home diuretic: PO lasix 40mg daily   - cont. Home metop succ 50mg daily & entresto 49-51mg BID  - further GDMT titration (?MRA or SGLT2i) pending stress cMRI results  - Daily standing weights, strict I&O's     HTN  HLD  - SBP past 24hrs: 110-130's  - cont. Home amlodipine 10mg daily, other BP meds as above   - LDL (7/2024): 54  - home pravastatin 80mg transitioned to atorvastatin 40mg nightly during this stay    T2DM, insulin dependent  - follows w/ endocrine Dr. Cronin; last hgb A1c 8.6% (7/9/2024)  - home insulin Humalin 70/30 70 units BID; cont. 50 units BID while inpatient  - ANA muniz, SSI #1, hypoglycemia protocol   - diabetic diet      BPH  Urge incontinence  -follows w/ urology (last seen OP on 7/16)  - continue home flomax 0.8mg daily; substitute home solifenacin w/ formulary oxybutynin 5mg TID     Chronic back pain   - cont. Home Robaxin 500mg nightly PRN  - Tylenol PRN     DVT ppx: lovenox  NOK: sister Felecia Ruiz #[phone]  Code status: Full code (confirmed on admission)  DISPO: would benefit from [facility]  - discharge pending ischemic work-up & medical optimization     Patient seen and discussed with Dr. Kwok

## 2024-07-17 NOTE — HOSPITAL COURSE
Dallas Ruiz Jr. is a 64 y.o. male presenting with past medical history CAD (s/p SILVINO to distal RCA in 3/2013 and proximal RCA in 7/2014, prox and n=mid LAD in 10/2014, PCI to Lcx 2018, reportedly 9 stents), ischemic cardiomyopathy w/ HFrecEF HF (EF 25-30% and diastolic dysfx, Recovered to 50-55% 2023), T2DM on insulin last hgb A1c 8.6% (7/2024), HTN, HLD who is admitted for chest pain with exertion concerning for angina. Patient currently HDS, on room air, no chest pain at rest; ECG not acutely ischemic but some concern in V2 for Wellens type A, history of LAD disease w/ previous ISR. Admitted for ischemic workup.     Floor course:  Stress cMRI was obtained to work up patient's stable angina, which revealed LVEF 37%. There is near transmural LGE/scar noted at the level of the partial mid anteroseptal, apical septum, apical anterior walls and apex consistent with prior mid LAD infarct. Limited viable myocardium remains in the apical anterior, apical septum and apex. 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 LV thrombus measuring 5.3 x 6.8 mm appreciated at the level of the distal apical septal wall.     We do not see enough evidence of new obstructive disease to repeat coronary angiogram particularly in the setting of normal troponins. We do suspect he has an area of infarct distal to the site of one of his prior LAD stents likely resulted in the LV thrombus. Patient is agreeable to starting Eliquis 5mg BID for LV thrombus. Initiated Imdur 30mg for angina and Jardiance 10mg for now moderately reduced EF.   Scheduled to follow up with his cardiologist, Dr. Danny Drew on 8/8/24  Patient requested to meet with SW prior to discharge for issues at home. Resources were given.    Medications were brought to bedside via meds to beds prior to discharge.    Discharge weight: 168 kg    After all labs and  VS were reviewed the decision was made that the patient was medically stable for discharge.  The patient was discharged in satisfactory condition.    More than 60 minutes were spent in coordinating patient discharge.

## 2024-07-17 NOTE — PROGRESS NOTES
Pharmacy Medication History Review    Dallas Ruiz Jr. is a 64 y.o. male admitted for Chest pain, unspecified type. Pharmacy reviewed the patient's [id number] medications and allergies for accuracy.    The list below reflects the updated PTA list. Comments regarding how patient may be taking medications differently can be found in the Admit Orders Activity  Prior to Admission Medications   Prescriptions Informant Taking?   albuterol 2.5 mg /3 mL (0.083 %) nebulizer solution Self Yes   Sig: Take 3 mL (2.5 mg) by nebulization every 6 hours if needed (asthma).   albuterol 90 mcg/actuation inhaler Self Yes   Sig: Inhale 1-2 puffs if needed (asthma, cough) every 4 to 6 hours as needed   amLODIPine (Norvasc) 10 mg tablet Self Yes   Sig: Take 1 tablet (10 mg) by mouth once daily.   aspirin 81 mg EC tablet Self Yes   Sig: Take 1 tablet (81 mg) by mouth once daily.   docusate sodium (Colace) 100 mg capsule Self Yes   Sig: Take 1 capsule (100 mg) by mouth 2 times a day as needed for constipation.   ferrous sulfate, 325 mg ferrous sulfate, tablet Self Yes   Sig: Take 1 tablet by mouth once daily with breakfast.   furosemide (Lasix) 40 mg tablet Self Yes   Sig: Take 1 tablet (40 mg) by mouth once daily.   insulin NPH and regular human (HumuLIN 70/30 U-100 Insulin) 100 unit/mL (70-30) injection Self Yes   Sig: INJECT 70 UNITS SUBCUTANEOUSLY BEFORE BREAKFAST AND 70 UNITS BEFORE DINNER AS DIRECTED   methocarbamol (Robaxin) 500 mg tablet Self Yes   Sig: Take 1 tablet (500 mg) by mouth as needed at bedtime for muscle spasms.   metoprolol succinate XL (Toprol-XL) 50 mg 24 hr tablet Self Yes   Sig: Take 1 tablet (50 mg) by mouth once daily.   montelukast (Singulair) 10 mg tablet Self Yes   Sig: Take 1 tablet (10 mg) by mouth once daily.   nitroglycerin (Nitrostat) 0.3 mg SL tablet Self Yes   Sig: Place 1 tablet (0.3 mg) under the tongue. Every 5 minutes times 3 doses. If chest pain continues, call 911   polyethylene glycol  (Glycolax, Miralax) 17 gram/dose powder Self Yes   Sig: Take 17 g by mouth once daily. Mix in 8 ounces of water, juice, or tea and drink   Patient taking differently: Take 17 g by mouth once daily as needed (constipation). Mix in 8 ounces of water, juice, or tea and drink   pravastatin (Pravachol) 80 mg tablet Self Yes   Sig: Take 1 tablet (80 mg) by mouth once daily.   sacubitriL-valsartan (Entresto) 49-51 mg tablet Self Yes   Sig: Take 1 tablet by mouth 2 times a day.   solifenacin (VESIcare) 10 mg tablet Self No   Sig: Take 1 tablet (10 mg) by mouth once daily. Swallow tablet whole; do not crush, chew, or split.    -->> new-- has not started yet   tadalafil (Cialis) 10 mg tablet  Yes   Sig: Take 1 tablet (10 mg) by mouth once daily as needed for erectile dysfunction.   tamsulosin (Flomax) 0.4 mg 24 hr capsule Self Yes   Sig: Take 2 capsules (0.8 mg) by mouth once daily 30 minutes after same meal or at bedtime with snack   ticagrelor (Brilinta) 90 mg tablet Self Yes   Sig: Take 1 tablet (90 mg) by mouth 2 times a day.      Facility-Administered Medications: None        The list below reflects the updated allergy list. Please review each documented allergy for additional clarification and justification.  Allergies  Reviewed by Komal Conway PharmD on 7/17/2024        Severity Reactions Comments    Amoxicillin Not Specified Unknown, Hives     Bupropion Not Specified Unknown             Patient accepts M2B at discharge. Pharmacy has been updated to [facility].    Sources used: Pharmacy dispense history, OARRs, patient interview (good historian- knew drug, dose and frequency of most medications), [facility] 6[facility], primary care note from 7/9 and urology note from 7/16    Below are additional concerns with the patient's PTA list.  -review comments above in RED    Komal Conway PharmD, [facility]  Transitions of Care Pharmacist   Meds Ambulatory and Retail  Services  Please reach out via Secure Chat for questions, or if no response call [email or web address] or [facility]

## 2024-07-17 NOTE — H&P
History Of Present Illness  Dallas Ruiz Jr. is a 64 y.o. male presenting with past medical history CAD (s/p SILVINO to distal RCA in 3/2013 and proximal RCA in 7/2014, prox and n=mid LAD in 10/2014, PCI to Lcx 2018, reportedly 9 stents), ischemic cardiomyopathy w/ HFrecEF HF (EF 25-30% and diastolic dysfx, Recovered to 50-55% 2023), T2DM on insulin last A1c 8.6%, HTN, HLD who is admitted for angina.    Patient says that he has been having episodic chest pain over the past week.  He gets chest pain when he is active such as when he is chasing his grandkids versus when he was walking into urology clinic on 7/15.  He describes the chest pain as pressure and left-sided with radiation to his left shoulder.  Pain was at worst on 7/15 when it was 10/10.  Most of the time pain will be around 6-7/10.  Alleviated with rest.  Reports 4 pillow orthopnea, PND, bilateral lower extremity edema left greater than right, intermittent palpitations.  Denies syncope/presyncope.  Endorses bendopnea.  Denies any recent sick contacts, denies cough denies fever/chills.  Does report shortness of breath with minimal exertion such as walking from his bed to his bathroom, but this is chronic for him.  Denies any recent viral infection.    In the ED:     VS: /76, HR 93, T 36.2    Labs:   CBC: 6.8, Hgb 13.0, Plt 207  BMP: 142/4.0  104/27  16/1.12 glucose 107, calcium 9.4, albumin 3.9  LFTs: ALP 73, AST 21, ALT 18, tB 0.4  Magnesium 1.84  BNP 13  hsTrop 11->12    ECG: NSR, no obvious ischemic change; ? biphasic T wave (Wellens type A) V2    Interventions:   Asa 324mg      Past Medical History  He has a past medical history of Age-related nuclear cataract, bilateral (11/13/2015), Calculus of ureter (02/20/2020), Encounter for screening for malignant neoplasm of prostate (01/28/2022), Encounter for screening for other disorder (02/13/2022), Gout, unspecified (01/19/2019), Morbid (severe) obesity due to excess calories (Multi), Old myocardial  infarction, Personal history of other diseases of the circulatory system (02/06/2019), Personal history of other diseases of the circulatory system (03/06/2019), Personal history of other diseases of the musculoskeletal system and connective tissue, Personal history of other diseases of the respiratory system (10/17/2014), Personal history of other endocrine, nutritional and metabolic disease (03/26/2014), Personal history of other mental and behavioral disorders, Primary open-angle glaucoma, unspecified eye, stage unspecified (11/13/2015), Pure hypercholesterolemia, unspecified, and Unqualified visual loss, both eyes (11/14/2014).    Surgical History  He has a past surgical history that includes Hernia repair (01/17/2014); Coronary angioplasty with stent (03/26/2014); Other surgical history (10/17/2014); and CT angio abdomen pelvis w and or wo IV IV contrast (2/12/2020).     Social History  He reports that he quit smoking about 36 years ago. His smoking use included cigarettes. He has been exposed to tobacco smoke. He has never used smokeless tobacco. He reports that he does not drink alcohol and does not use drugs.    Family History  Family History   Problem Relation Name Age of Onset    Diabetes Mother      Breast cancer Mother      Heart attack Father      Heart attack Brother          Allergies  Amoxicillin and Bupropion     Physical Exam  Constitutional:       General: He is not in acute distress.     Appearance: Normal appearance. He is obese. He is not ill-appearing or toxic-appearing.   HENT:      Head: Normocephalic and atraumatic.      Mouth/Throat:      Mouth: Mucous membranes are moist.      Pharynx: Oropharynx is clear.   Eyes:      General: No scleral icterus.     Conjunctiva/sclera: Conjunctivae normal.      Pupils: Pupils are equal, round, and reactive to light.   Cardiovascular:      Rate and Rhythm: Normal rate and regular rhythm.      Pulses: Normal pulses.      Heart sounds: No murmur heard.      No friction rub. No gallop.   Pulmonary:      Effort: Pulmonary effort is normal. No respiratory distress.      Breath sounds: Normal breath sounds. No wheezing or rhonchi.   Abdominal:      General: Bowel sounds are normal. There is no distension.      Palpations: Abdomen is soft.      Tenderness: There is no abdominal tenderness. There is no guarding.   Musculoskeletal:         General: Normal range of motion.      Cervical back: Normal range of motion.      Right lower leg: Edema present.      Left lower leg: Edema present.      Comments: Non pitting bilateral edema   Skin:     General: Skin is warm and dry.      Capillary Refill: Capillary refill takes less than 2 seconds.      Comments: Skin changes consistent w/ chronic venous insufficiency on RLE    Neurological:      General: No focal deficit present.      Mental Status: He is alert and oriented to person, place, and time. Mental status is at baseline.   Psychiatric:         Mood and Affect: Mood normal.         Thought Content: Thought content normal.          Last Recorded Vitals  /82   Pulse 75   Temp 36.3 °C (97.4 °F) (Temporal)   Resp 20   SpO2 97%     Relevant Results  Scheduled medications  amLODIPine, 10 mg, oral, Daily  aspirin, 81 mg, oral, Daily  atorvastatin, 40 mg, oral, Nightly  enoxaparin, 40 mg, subcutaneous, q12h CLEMENTE  ferrous sulfate (325 mg ferrous sulfate), 1 tablet, oral, Daily with breakfast  furosemide, 40 mg, oral, Daily  insulin NPH and regular human, 50 Units, subcutaneous, BID AC  metoprolol succinate XL, 50 mg, oral, Daily  montelukast, 10 mg, oral, Daily  oxybutynin, 5 mg, oral, TID  polyethylene glycol, 17 g, oral, Daily  sacubitriL-valsartan, 1 tablet, oral, BID  sennosides, 2 tablet, oral, BID  tamsulosin, 0.8 mg, oral, Daily  ticagrelor, 90 mg, oral, BID      Continuous medications     PRN medications  PRN medications: albuterol, dextrose, dextrose, glucagon, glucagon, methocarbamol  Results for orders placed or  performed during the hospital encounter of 07/16/24 (from the past 24 hour(s))   CBC and Auto Differential   Result Value Ref Range    WBC 6.8 4.4 - 11.3 x10*3/uL    nRBC 0.0 0.0 - 0.0 /100 WBCs    RBC 4.68 4.50 - 5.90 x10*6/uL    Hemoglobin 13.0 (L) 13.5 - 17.5 g/dL    Hematocrit 38.9 (L) 41.0 - 52.0 %    MCV 83 80 - 100 fL    MCH 27.8 26.0 - 34.0 pg    MCHC 33.4 32.0 - 36.0 g/dL    RDW 14.3 11.5 - 14.5 %    Platelets 207 150 - 450 x10*3/uL    Neutrophils % 51.9 40.0 - 80.0 %    Immature Granulocytes %, Automated 0.1 0.0 - 0.9 %    Lymphocytes % 37.4 13.0 - 44.0 %    Monocytes % 9.0 2.0 - 10.0 %    Eosinophils % 1.3 0.0 - 6.0 %    Basophils % 0.3 0.0 - 2.0 %    Neutrophils Absolute 3.51 1.20 - 7.70 x10*3/uL    Immature Granulocytes Absolute, Automated 0.01 0.00 - 0.70 x10*3/uL    Lymphocytes Absolute 2.53 1.20 - 4.80 x10*3/uL    Monocytes Absolute 0.61 0.10 - 1.00 x10*3/uL    Eosinophils Absolute 0.09 0.00 - 0.70 x10*3/uL    Basophils Absolute 0.02 0.00 - 0.10 x10*3/uL   Magnesium   Result Value Ref Range    Magnesium 1.84 1.60 - 2.40 mg/dL   Comprehensive metabolic panel   Result Value Ref Range    Glucose 107 (H) 74 - 99 mg/dL    Sodium 142 136 - 145 mmol/L    Potassium 4.0 3.5 - 5.3 mmol/L    Chloride 104 98 - 107 mmol/L    Bicarbonate 27 21 - 32 mmol/L    Anion Gap 15 10 - 20 mmol/L    Urea Nitrogen 16 6 - 23 mg/dL    Creatinine 1.12 0.50 - 1.30 mg/dL    eGFR 74 >60 mL/min/1.73m*2    Calcium 9.4 8.6 - 10.6 mg/dL    Albumin 3.9 3.4 - 5.0 g/dL    Alkaline Phosphatase 73 33 - 136 U/L    Total Protein 6.8 6.4 - 8.2 g/dL    AST 21 9 - 39 U/L    Bilirubin, Total 0.4 0.0 - 1.2 mg/dL    ALT 18 10 - 52 U/L   B-Type Natriuretic Peptide   Result Value Ref Range    BNP 13 0 - 99 pg/mL   Troponin I, High Sensitivity, Initial   Result Value Ref Range    Troponin I, High Sensitivity ([facility]) 11 0 - 53 ng/L   Troponin, High Sensitivity, 1 Hour   Result Value Ref Range    Troponin I, High Sensitivity ([facility]) 12 0 - 53 ng/L   Light  Blue Top   Result Value Ref Range    Extra Tube Hold for add-ons.          Echo 2/2023:   CONCLUSIONS:   1. Poorly visualized anatomical structures due to suboptimal image quality.   2. Left ventricular systolic function is low normal with a 50-55% estimated ejection fraction.   3. No left ventricular thrombus visualized.   4. Within the limitations of the study, no significant changes noted.    Left heart cath 12/2018 (Dr. Jain)  Coronary Angiography:  The coronary circulation is right dominant.     Left Main Coronary Artery:  The left main coronary artery is a normal caliber vessel. The left main arises normally from the left coronary sinus of Valsalva and bifurcates into the LAD and circumflex coronary arteries. The left main coronary artery showed no significant disease or stenosis greater than 30%.     Left Anterior Descending Coronary Artery Distribution:  The left anterior descending coronary artery is a normal caliber vessel. The LAD arises normally from the left main coronary artery. The LAD is a medium caliber vessel that was previously 100% occluded in the middle third due to ISR. It has now recanalized. There are stents in the proximal and middle thirds of the vesselthat have severe in-stent restenosis along their entire length. There is a jailed first diagonal branch. There is severe diffuse disease in the distal and apical LAD.     Circumflex Coronary Artery Distribution:  The circumflex coronary artery is a normal caliber vessel. The circumflex arises normally from the left main coronary artery and terminates in the AV groove. The circumflex revealed atherosclerotic disease. The proximal circumflex coronary artery showed 90% stenosis.     Ramus Intermedius:  The ramus intermedius arises normally from the left main coronary artery. The ramus intermedius showed mild irregularities.     Right Coronary Artery Distribution:     The right coronary artery is a normal caliber vessel. The RCA arises  normally from the right sinus of Valsalva. The RCA showed moderate irregularities. The mid right coronary artery showed 60% stenosis.     Coronary Interventions:  Angiography reveals a 90% stenosis of the proximal circumflex. Pre-intervention VILMA flow was 3. Percutaneous coronary intervention was performed within the proximal circumflex. The vessel was pre-dilated using a non-compliant balloon 2.5 mm x 12 mm at 16 BAILEY. Resolute Matthew drug-eluting stent 3.0 mm x 18 mm was advanced to the lesion and implanted at 20 BAILEY. The stent was post dilated using a non-compliant balloon 3.0 mm x 12 mm at 26 BAILEY. Additional dilatation was performed using a non-compliant balloon 3.5 mm x 6 mm at 16 BAILEY. The stenosis was successfully reduced from 90% to <10%. Post-intervention VILMA flow was 3.     Fractional Flow Reserve (FFR) of the right coronary artery lesion was performed. Baseline Pd/Pa was 1.0. The FFR after hyperemia 140 mcg/kg/min, was 0.99.    CONCLUSIONS:   1. Severe obstructive disease of the proximal LCx s/p 3.0 x 18 mm Resolute Eldon, post-dilated proximally with a 3.5NC balloon.   2. Negative FFR of the RCA.   3. Continue ASA 81 mg PO daily for life and ticagrelor 90 mg PO BID for at least one year.     Assessment/Plan   Principal Problem:    Chest pain, unspecified type  Dallas Ruiz Jr. is a 64 y.o. male presenting with past medical history CAD (s/p SILVINO to distal RCA in 3/2013 and proximal RCA in 7/2014, prox and n=mid LAD in 10/2014, PCI to Lcx 2018, reportedly 9 stents), ischemic cardiomyopathy w/ HFrecEF HF (EF 25-30% and diastolic dysfx, Recovered to 50-55% 2023), T2DM on insulin last A1c 8.6%, HTN, HLD who is admitted for chest pain with exertion concerning for angina. Patient currently HDS, on room air, no chest pain at rest; ECG not acutely ischemic but some concern in V2 for Wellens type A, history of LAD disease w/ previous ISR. Admitted for ischemic workup.     #Angina  #CAD s/p multiple PCI to LAD, RCA,  Lcx (s/p SILVINO to distal RCA in 3/2013 and proximal RCA in 7/2014, prox and mid LAD in 10/2014, PCI to Lcx 2018)  #Ischemic cardiomyopathy, HFrecEF (reportedly previously 25-30%, LVEF 50-55 2/2023)  :: patient presenting with CP on exertion improving w/ rest concerning for angina  :: Troponin 11->12 in ED, lower concern for acute myocardial ischemia; possible Wellens sign type A in V2 on ECG new compared to previous   - currently without chest pain on interview, euvolemic on exam  -s/p asa 324mg in daily   Plan:  -Echocardiogram ordered (was previously ordered 2/2024 by primary cardiologist Dr. Danny Drew)  -Consider further ischemic testing (stress testing vs cath)  - continue home ticagrelor 90mg BID and asa 81 mg daily (unclear why on DAPT, possible due to significant disease)  - continue home entresto 49/51mg BID, metoprolol succinate 50mg daily (consider adding MRA/SGLT2i for GDMT)  - continue home lasix 40mg daily (currently euvolemic)    #HTN  #HLD  -continue home amlodipine 10mg daily; consider stopping this, increasing entresto dose  - stop home pravastatin 80mg daily; start atorvastatin 40mg daily     #T2DM, insulin dependent  :: Follows w/ endocrine Dr. Cronin; last A1c 8.6% 7/9/2024  - home insulin Humalin 70/30 70 units BID; decrease to 50 units BID while inpatient  - hypoglycemia protocol  - Diabetic diet when able to eat     #BPH  #Urge incontinence  -follows w/ urology  - continue home flomax 0.8mg daily; substitute home solifenacin w/ formulary oxybutynin 5mg TID    #Chronic back pain   -Robaxin 500mg nightly PRN    F: PRN  E: K>4, Mg>2  N: NPO for now; then diabetic diet  A: PIV  Abx: none  O2: RA  GI ppx: n/a  DVT ppx: lovenox    NOK: sister Felecia Ruiz [phone]  Code status: Full code (confirmed on admission)    Patient to be seen and staffed w/ attending physician in the AM.       Tyson Juarez MD          I saw and evaluated the patient. I personally obtained the key and critical  portions of the history and physical exam or was physically present for key and critical portions performed by the resident/fellow. I reviewed the resident/fellow's documentation and discussed the patient with the resident/fellow. I agree with the resident/fellow's medical decision making as documented in the note.    Briefly patient with history of coronary artery disease presented with chest pain, apparently this is somewhat of a chronic issue with exertional chest pain for which she occasionally takes nitroglycerin he reported this at a doctor's visit and was sent to the emergency room.  His troponin has been negative so we will pursue noninvasive stress test if positive proceed with coronary angiogram-he seems to have room for up titration of antianginal therapy.

## 2024-07-18 PROBLEM — R07.9 CHEST PAIN: Status: ACTIVE | Noted: 2024-07-18

## 2024-07-18 LAB
ALBUMIN SERPL BCP-MCNC: 3.8 G/DL (ref 3.4–5)
ANION GAP SERPL CALC-SCNC: 13 MMOL/L (ref 10–20)
BUN SERPL-MCNC: 20 MG/DL (ref 6–23)
CALCIUM SERPL-MCNC: 9.2 MG/DL (ref 8.6–10.6)
CHLORIDE SERPL-SCNC: 101 MMOL/L (ref 98–107)
CO2 SERPL-SCNC: 28 MMOL/L (ref 21–32)
CREAT SERPL-MCNC: 1.13 MG/DL (ref 0.5–1.3)
EGFRCR SERPLBLD CKD-EPI 2021: 73 ML/MIN/1.73M*2
ERYTHROCYTE [DISTWIDTH] IN BLOOD BY AUTOMATED COUNT: 14.1 % (ref 11.5–14.5)
GLUCOSE BLD MANUAL STRIP-MCNC: 111 MG/DL (ref 74–99)
GLUCOSE BLD MANUAL STRIP-MCNC: 182 MG/DL (ref 74–99)
GLUCOSE BLD MANUAL STRIP-MCNC: 307 MG/DL (ref 74–99)
GLUCOSE BLD MANUAL STRIP-MCNC: 344 MG/DL (ref 74–99)
GLUCOSE SERPL-MCNC: 165 MG/DL (ref 74–99)
HCT VFR BLD AUTO: 40.9 % (ref 41–52)
HGB BLD-MCNC: 12.9 G/DL (ref 13.5–17.5)
MAGNESIUM SERPL-MCNC: 2.07 MG/DL (ref 1.6–2.4)
MCH RBC QN AUTO: 27.1 PG (ref 26–34)
MCHC RBC AUTO-ENTMCNC: 31.5 G/DL (ref 32–36)
MCV RBC AUTO: 86 FL (ref 80–100)
NRBC BLD-RTO: 0 /100 WBCS (ref 0–0)
PHOSPHATE SERPL-MCNC: 3.1 MG/DL (ref 2.5–4.9)
PLATELET # BLD AUTO: 214 X10*3/UL (ref 150–450)
POTASSIUM SERPL-SCNC: 4.3 MMOL/L (ref 3.5–5.3)
RBC # BLD AUTO: 4.76 X10*6/UL (ref 4.5–5.9)
SODIUM SERPL-SCNC: 138 MMOL/L (ref 136–145)
WBC # BLD AUTO: 6.2 X10*3/UL (ref 4.4–11.3)

## 2024-07-18 PROCEDURE — 2500000001 HC RX 250 WO HCPCS SELF ADMINISTERED DRUGS (ALT 637 FOR MEDICARE OP): Mod: SE

## 2024-07-18 PROCEDURE — 2500000004 HC RX 250 GENERAL PHARMACY W/ HCPCS (ALT 636 FOR OP/ED)

## 2024-07-18 PROCEDURE — 99233 SBSQ HOSP IP/OBS HIGH 50: CPT | Performed by: INTERNAL MEDICINE

## 2024-07-18 PROCEDURE — 82947 ASSAY GLUCOSE BLOOD QUANT: CPT | Mod: 59

## 2024-07-18 PROCEDURE — 85027 COMPLETE CBC AUTOMATED: CPT | Mod: 59 | Performed by: NURSE PRACTITIONER

## 2024-07-18 PROCEDURE — 2500000002 HC RX 250 W HCPCS SELF ADMINISTERED DRUGS (ALT 637 FOR MEDICARE OP, ALT 636 FOR OP/ED): Mod: SE

## 2024-07-18 PROCEDURE — RXMED WILLOW AMBULATORY MEDICATION CHARGE

## 2024-07-18 PROCEDURE — 83735 ASSAY OF MAGNESIUM: CPT | Performed by: NURSE PRACTITIONER

## 2024-07-18 PROCEDURE — 2500000001 HC RX 250 WO HCPCS SELF ADMINISTERED DRUGS (ALT 637 FOR MEDICARE OP): Mod: SE | Performed by: NURSE PRACTITIONER

## 2024-07-18 PROCEDURE — 80069 RENAL FUNCTION PANEL: CPT | Performed by: NURSE PRACTITIONER

## 2024-07-18 PROCEDURE — 36415 COLL VENOUS BLD VENIPUNCTURE: CPT | Performed by: NURSE PRACTITIONER

## 2024-07-18 PROCEDURE — G0378 HOSPITAL OBSERVATION PER HR: HCPCS

## 2024-07-18 PROCEDURE — 2500000002 HC RX 250 W HCPCS SELF ADMINISTERED DRUGS (ALT 637 FOR MEDICARE OP, ALT 636 FOR OP/ED): Mod: SE | Performed by: NURSE PRACTITIONER

## 2024-07-18 RX ORDER — FERROUS SULFATE 325(65) MG
325 TABLET ORAL
Qty: 30 TABLET | Refills: 3 | Status: SHIPPED | OUTPATIENT
Start: 2024-07-18

## 2024-07-18 RX ORDER — ISOSORBIDE MONONITRATE 30 MG/1
30 TABLET, EXTENDED RELEASE ORAL DAILY
Status: DISCONTINUED | OUTPATIENT
Start: 2024-07-18 | End: 2024-07-19 | Stop reason: HOSPADM

## 2024-07-18 RX ORDER — POLYETHYLENE GLYCOL 3350 17 G/17G
17 POWDER, FOR SOLUTION ORAL DAILY PRN
Start: 2024-07-18

## 2024-07-18 RX ORDER — ISOSORBIDE MONONITRATE 30 MG/1
30 TABLET, EXTENDED RELEASE ORAL DAILY
Qty: 30 TABLET | Refills: 3 | Status: SHIPPED | OUTPATIENT
Start: 2024-07-19

## 2024-07-18 RX ORDER — ASPIRIN 81 MG/1
81 TABLET ORAL DAILY
Qty: 30 TABLET | Refills: 3 | Status: SHIPPED | OUTPATIENT
Start: 2024-07-18

## 2024-07-18 RX ORDER — NITROGLYCERIN 0.4 MG/1
0.4 TABLET SUBLINGUAL EVERY 5 MIN PRN
Qty: 90 TABLET | Refills: 12 | Status: SHIPPED | OUTPATIENT
Start: 2024-07-18

## 2024-07-18 ASSESSMENT — PAIN - FUNCTIONAL ASSESSMENT
PAIN_FUNCTIONAL_ASSESSMENT: 0-10

## 2024-07-18 ASSESSMENT — PAIN SCALES - GENERAL
PAINLEVEL_OUTOF10: 0 - NO PAIN

## 2024-07-18 ASSESSMENT — COGNITIVE AND FUNCTIONAL STATUS - GENERAL
WALKING IN HOSPITAL ROOM: A LITTLE
DAILY ACTIVITIY SCORE: 24
CLIMB 3 TO 5 STEPS WITH RAILING: A LITTLE
MOBILITY SCORE: 22

## 2024-07-18 NOTE — PROGRESS NOTES
Subjective Data:  Patient reports some mild chest pain during a BM this AM, self resolved without intervention. He has been able to ambulate around his room without limitation, has not walked the halls. Encouraged walking halls today to assess exertional chest pain prior to discharge.    - stress cMRI: Ischemic cardiomyopathy. LVEF 37%. There is near transmural LGE/scar noted at the level of the partial mid anteroseptal, apical septum, apical anterior walls and apex consistent with prior mid LAD infarct. Limited viable myocardium remains in the apical anterior, apical septum and apex. 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 LV thrombus measuring 5.3 x 6.8 mm appreciated at the level of the distal apical septal wall.  - no current indication for LHC/revascularization as there would likely be no benefit  - to start Eliquis 5mg BID ($0 copay) this evening for LV thrombus, will monitor overnight  - start Imdur 30mg  - start Jardiance 10mg ($0 copay)  - follow up with Dr. Drew (cards) 8/8/24  - plan for early discharge tomorrow   - meds to beds ordered for tomorrow AM    Overnight Events:    No acute events overnight     Objective Data:  Last Recorded Vitals:  Vitals:    07/17/24 2340 07/18/24 0428 07/18/24 0739 07/18/24 1110   BP: 131/79 118/76 118/74 101/65   BP Location:   Left arm Left arm   Patient Position: Lying Lying Lying Lying   Pulse: 67 65 62 50   Resp: 18 18 18 18   Temp: 37.2 °C (99 °F) 36.9 °C (98.4 °F) 36.2 °C (97.2 °F) 36.6 °C (97.9 °F)   TempSrc:   Temporal Temporal   SpO2: 98% 96% 97% 96%   Weight:  (!) 167 kg (369 lb 0.8 oz)         Last Labs:  CBC - 7/17/2024:  6:49 PM  7.6 12.7 200    39.9      CMP - 7/17/2024:  6:49 PM  8.9 6.8 21 --- 0.4   4.5 3.6 18 73      PTT - No results in last year.  _   _ _     TROPHS   Date/Time Value Ref Range Status   07/17/2024 01:32 AM 12 0 - 53  ng/L Final   07/17/2024 12:09 AM 11 0 - 53 ng/L Final   03/08/2024 10:30 AM 15 0 - 53 ng/L Final     BNP   Date/Time Value Ref Range Status   07/17/2024 12:09 AM 13 0 - 99 pg/mL Final   03/08/2024 10:30 AM 55 0 - 99 pg/mL Final     HGBA1C   Date/Time Value Ref Range Status   07/09/2024 11:19 AM 8.6 see below % Final   05/30/2024 01:45 PM 8.4 4.3 - 5.6 % Final     Comment:     Location:[facility], [address], [place], [address]  Point of care (POC) Hemoglobin A1c (HGBA1C) testing is intended to assess  glucose control and provide a management tool for patients known to have  diabetes and their healthcare providers.  Target HGBA1C levels may depend on  specific clinical circumstances.  POC HGBA1C is not intended for use as a  diagnostic or screening test; laboratory-based testing should be used for  diagnostic purposes.  The following information is supplemental and may not  be applicable to specific diabetes management situations:  The POC device   provides a normal range of 4.2% to 6.5% for the HGBA1C POC test.  However, the American Diabetes Association  guidelines indicate that  patients with HGBA1C in the range of 5.7% to 6.4% are at increased risk for  development of diabetes and that intervention by lifestyle modification may  be beneficial.  A HGBA1C level greater than or equal to 6.5% is considered  diagnostic of diabetes, pending confirmatory testing.  Use of HGBA1C testing  to evaluate glucose control may not be appropriate for patients with  hemoglobin variants or other conditions (e.g. anemia) that alter red blood  cell lifespan.   03/08/2024 10:30 AM 12.1 see below % Final   10/04/2019 09:32 AM 7.1 4.2 - 6.5 % Final   03/06/2019 11:23 AM 9.7 4.2 - 6.5 % Final     LDLCALC   Date/Time Value Ref Range Status   07/17/2024 12:09 AM 54 <=99 mg/dL Final     Comment:                                 Near   Borderline      AGE      Desirable  Optimal    High     " High     Very High     0-19 Y     0 - 109     ---    110-129   >/= 130     ----    20-24 Y     0 - 119     ---    120-159   >/= 160     ----      >24 Y     0 -  99   100-129  130-159   160-189     >/=190     03/08/2024 10:30 AM 72 <=99 mg/dL Final     Comment:                                 Near   Borderline      AGE      Desirable  Optimal    High     High     Very High     0-19 Y     0 - 109     ---    110-129   >/= 130     ----    20-24 Y     0 - 119     ---    120-159   >/= 160     ----      >24 Y     0 -  99   100-129  130-159   160-189     >/=190       VLDL   Date/Time Value Ref Range Status   07/17/2024 12:09 AM 37 0 - 40 mg/dL Final   03/08/2024 10:30 AM 18 0 - 40 mg/dL Final   01/28/2022 12:00 AM 22 0 - 40 mg/dL Final   12/18/2018 06:32 AM 26 0 - 40 mg/dL Final      Last I/O:  I/O last 3 completed shifts:  In: 5.4 (0 mL/kg) [I.V.:5.4 (0 mL/kg)]  Out: - (0 mL/kg)   Weight: 167.4 kg     Past Cardiology Tests (Last 3 Years):  EKG:  ECG 12 lead 07/17/2024      ECG 12 lead (Clinic Performed) 02/20/2024    Echo:  No results found for this or any previous visit from the past 1095 days.    Ejection Fractions:  No results found for: \"EF\"  Cath:  No results found for this or any previous visit from the past 1095 days.    Stress Test:  No results found for this or any previous visit from the past 1095 days.    Cardiac Imaging:  MR cardiac w and wo IV contrast w regadenoson stress for MORPH FUNCT and valve DZ 07/17/2024      Inpatient Medications:  Scheduled medications   Medication Dose Route Frequency    amLODIPine  10 mg oral Daily    aspirin  81 mg oral Daily    atorvastatin  40 mg oral Nightly    enoxaparin  40 mg subcutaneous q12h CLEMENTE    ferrous sulfate (325 mg ferrous sulfate)  1 tablet oral Daily with breakfast    furosemide  40 mg oral Daily    insulin lispro  0-5 Units subcutaneous TID    insulin NPH and regular human  50 Units subcutaneous BID AC    isosorbide mononitrate ER  30 mg oral Daily    metoprolol " succinate XL  50 mg oral Daily    montelukast  10 mg oral Daily    oxybutynin  5 mg oral TID    polyethylene glycol  17 g oral Daily    sacubitriL-valsartan  1 tablet oral BID    sennosides  2 tablet oral BID    tamsulosin  0.8 mg oral Daily    ticagrelor  90 mg oral BID     PRN medications   Medication    acetaminophen    albuterol    dextrose    dextrose    glucagon    glucagon    methocarbamol    nitroglycerin     Continuous Medications   Medication Dose Last Rate       Physical Exam:  General: NAD, lying in bed  Skin: warm and dry throughout  Head/ neck: unable to appreciate JVD 2/2 body habitus   Cardiac: RRR, S1, S2   Pulm: CTAB, room air   GI: obese, non-tender  Extremities: trace, non-pitting BLE edema   Neuro: no focal neuro deficits, visually impaired (baseline), a+ox4  Psych: somewhat flat affect, appropriate mood and behavior     Assessment/Plan   Dallas Ruiz Jr. is a 64 y.o. male presenting with past medical history CAD (s/p SILVINO to distal RCA in 3/2013 and proximal RCA in 7/2014, prox and n=mid LAD in 10/2014, PCI to Lcx 2018, reportedly 9 stents), ischemic cardiomyopathy w/ HFrEF HF (EF 25-30% and diastolic dysfx 2018, Recovered to 50-55% 2023, now reduced to EF 37% 7/2024), T2DM on insulin last hgb A1c 8.6% (7/2024), HTN, HLD who is admitted for chest pain with exertion concerning for angina. Under [facility] Service for further management.      Stable angina  CAD s/p multiple PCI to LAD, RCA, Lcx (s/p SILVINO to distal RCA in 3/2013 and proximal RCA in 7/2014, prox and mid LAD in 10/2014, PCI to Lcx 2018)  LV thrombus  - Primary cardiologist: Dr. Danny Drew  - patient presenting with CP on exertion improving w/ rest concerning for angina  - upon further discussion, patient reports this chest discomfort has been ongoing for quite some time and it will prevent him from performing his usual ADL's maybe ~once/month, if that--->reports mild CP with BM this morning  - HS Troponin 11->12  - in ED, possible  Wellens sign type A in V2 on ECG new compared to previous   - s/p asa [id number] in ED  - stress cMRI 7/17: Ischemic cardiomyopathy. LVEF 37%. There is near transmural LGE/scar noted at the level of the partial mid anteroseptal, apical septum, apical anterior walls and apex consistent with prior mid LAD infarct. Limited viable myocardium remains in the apical anterior, apical septum and apex. 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 LV thrombus measuring 5.3 x 6.8 mm appreciated at the level of the distal apical septal wall.  - no current indication for LHC/revascularization as there would likely be no benefit  - to start Eliquis 5mg BID ($0 copay) this evening for LV thrombus, will monitor overnight  - start Imdur 30mg  - cont. home ticagrelor 90mg BID and asa 81 mg daily (unclear why on DAPT, possible due to significant disease? Continued upon last OP visit with primary cards)  - cont. Statin 40mg nightly and metop succ 50mg daily   - cont. 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 Home diuretic: PO lasix 40mg daily   - cont. Home metop succ 50mg daily & entresto 49-51mg BID  - start Jardiance 10mg ($0 copay)  - was recently on spironolactone 25mg, consider re initiation as an outpatient  - Daily standing weights, strict I&O's     HTN  HLD  - SBP past 24hrs: 110-130's  - cont. Home amlodipine 10mg daily, other BP meds as above   - LDL (7/2024): 54  - home pravastatin 80mg transitioned to atorvastatin 40mg nightly during this stay     T2DM, insulin dependent  - follows w/ endocrine Dr. Cronin; last hgb A1c 8.6% (7/9/2024)  - home insulin  Humalin 70/30 70 units BID; cont. 50 units BID while inpatient  - ACHS accjodie, SSI #1, hypoglycemia protocol   - diabetic diet      BPH  Urge incontinence  -follows w/ urology (last seen OP on 7/16)  - continue home flomax 0.8mg daily; substitute home solifenacin w/ formulary oxybutynin 5mg TID     Chronic back pain   - cont. Home Robaxin 500mg nightly PRN  - Tylenol PRN       DVT ppx: Eliquis (to start this evening)  NOK: sister Felecia Ruiz #[phone]  Code status: Full code (confirmed on admission)  DISPO: discharge early tomorrow AM, meds to beds ordered  - follow up with Dr. Drew (cards) 8/8/24     Patient seen and discussed with Dr. Sundar Elena PA-C    I conducted a shared care visit with the CATE    He continues to feel well no chest pain today we reviewed his cardiac MRI which does show a drop in ejection fraction compared to the prior now moderately reduced with some evidence of infarct and carmelita-infarct ischemia I do not see enough evidence of new obstructive disease to repeat coronary angiogram particularly in the setting of normal troponins I do suspect he has an area of infarct distal to the site of one of his prior LAD stents likely resulted in the LV thrombus that we see we discussed the plan of initiation of apixaban with repeat imaging in the future he seems to be maximized in terms of medical therapy with Entresto and beta-blocker is no longer on spironolactone we may reinitiate that prior to discharge but I would also like to start SGLT2 particular given his diabetes I reached out to his endocrinologist about starting this plan to discharge tomorrow after placing him on the tentative outpatient regimen.  55 minutes spent total reviewing data discussing with patient and other providers

## 2024-07-18 NOTE — CARE PLAN
Problem: Fall/Injury  Goal: Not fall by end of shift  Outcome: Progressing  Goal: Be free from injury by end of the shift  Outcome: Progressing  Goal: Verbalize understanding of personal risk factors for fall in the hospital  Outcome: Progressing  Goal: Verbalize understanding of risk factor reduction measures to prevent injury from fall in the home  Outcome: Progressing  Goal: Use assistive devices by end of the shift  Outcome: Progressing  Goal: Pace activities to prevent fatigue by end of the shift  Outcome: Progressing     Problem: ACS/CP/NSTEMI/STEMI  Goal: Chest pain managed (free from pain or at acceptable level)  Outcome: Progressing  Goal: Lab values return to normal range  Outcome: Progressing  Goal: Promote self management  Outcome: Progressing  Goal: Serial ECG will return to baseline  Outcome: Progressing  Goal: Verbalize understanding of procedures/devices  Outcome: Progressing  Goal: Wean vasopressors/achieve hemodynamic stability  Outcome: Progressing     Problem: Arrythmia/Dysrhythmia  Goal: Lab values return to normal range  Outcome: Progressing  Goal: No evidence of post procedure complications  Outcome: Progressing  Goal: Promote self management  Outcome: Progressing  Goal: Serial ECG will return to baseline  Outcome: Progressing  Goal: Verbalize understanding of procedures/devices  Outcome: Progressing  Goal: Vital signs return to baseline  Outcome: Progressing  Goal: Care and maintenance of device (specify)  Outcome: Progressing     Problem: Cardiac catheterization  Goal: Free from dysrhythmias  Outcome: Progressing  Goal: Free from pain  Outcome: Progressing  Goal: No evidence of post procedure complications  Outcome: Progressing  Goal: Promote self management  Outcome: Progressing  Goal: Verbalize understanding of procedure  Outcome: Progressing  Goal: Care and maintenance of device (specify)  Outcome: Progressing     Problem: Hypertensive Emergency/Crisis  Goal: Blood pressure gradually  reduced to goal range  Outcome: Progressing  Goal: Free from signs of organ damage  Outcome: Progressing  Goal: Lab values return to normal range  Outcome: Progressing  Goal: Promote self management  Outcome: Progressing

## 2024-07-19 ENCOUNTER — PHARMACY VISIT (OUTPATIENT)
Dept: PHARMACY | Facility: CLINIC | Age: 64
End: 2024-07-19
Payer: MEDICAID

## 2024-07-19 VITALS
HEART RATE: 64 BPM | DIASTOLIC BLOOD PRESSURE: 68 MMHG | OXYGEN SATURATION: 96 % | BODY MASS INDEX: 47.58 KG/M2 | SYSTOLIC BLOOD PRESSURE: 109 MMHG | RESPIRATION RATE: 18 BRPM | WEIGHT: 315 LBS | TEMPERATURE: 97 F

## 2024-07-19 PROBLEM — I51.3 LV (LEFT VENTRICULAR) MURAL THROMBUS: Status: ACTIVE | Noted: 2024-07-19

## 2024-07-19 LAB
ATRIAL RATE: 72 BPM
GLUCOSE BLD MANUAL STRIP-MCNC: 117 MG/DL (ref 74–99)
P AXIS: 54 DEGREES
P OFFSET: 151 MS
P ONSET: 110 MS
PR INTERVAL: 226 MS
Q ONSET: 223 MS
QRS COUNT: 12 BEATS
QRS DURATION: 72 MS
QT INTERVAL: 358 MS
QTC CALCULATION(BAZETT): 392 MS
QTC FREDERICIA: 380 MS
R AXIS: -25 DEGREES
T AXIS: 49 DEGREES
T OFFSET: 402 MS
VENTRICULAR RATE: 72 BPM

## 2024-07-19 PROCEDURE — 2500000002 HC RX 250 W HCPCS SELF ADMINISTERED DRUGS (ALT 637 FOR MEDICARE OP, ALT 636 FOR OP/ED): Mod: SE

## 2024-07-19 PROCEDURE — 2500000001 HC RX 250 WO HCPCS SELF ADMINISTERED DRUGS (ALT 637 FOR MEDICARE OP): Mod: SE

## 2024-07-19 PROCEDURE — G0378 HOSPITAL OBSERVATION PER HR: HCPCS

## 2024-07-19 PROCEDURE — 82947 ASSAY GLUCOSE BLOOD QUANT: CPT

## 2024-07-19 PROCEDURE — 99239 HOSP IP/OBS DSCHRG MGMT >30: CPT | Performed by: INTERNAL MEDICINE

## 2024-07-19 ASSESSMENT — COGNITIVE AND FUNCTIONAL STATUS - GENERAL
DAILY ACTIVITIY SCORE: 24
MOBILITY SCORE: 22
CLIMB 3 TO 5 STEPS WITH RAILING: A LITTLE
WALKING IN HOSPITAL ROOM: A LITTLE

## 2024-07-19 NOTE — NURSING NOTE
Nursing discharge note 7/19/2024 1115:  Patient discharged home. Transported by [employer] to home. Meds to bed received. Discharge instructions, medications and appointments explained. Patient verbalized understanding. Telemetry box taken off, IV taken out with tip intact.  Florencio Lorenzo LPN

## 2024-07-19 NOTE — DISCHARGE SUMMARY
Discharge Diagnosis  Chest pain, unspecified type    Issues Requiring Follow-Up  Follow up with cardiologist, Dr. Drew on 8/8    Test Results Pending At Discharge  Pending Labs       No current pending labs.            Hospital Course  Dallas Ruiz Jr. is a 64 y.o. male presenting with past medical history CAD (s/p SILVINO to distal RCA in 3/2013 and proximal RCA in 7/2014, prox and n=mid LAD in 10/2014, PCI to Lcx 2018, reportedly 9 stents), ischemic cardiomyopathy w/ HFrecEF HF (EF 25-30% and diastolic dysfx, Recovered to 50-55% 2023), T2DM on insulin last hgb A1c 8.6% (7/2024), HTN, HLD who is admitted for chest pain with exertion concerning for angina. Patient currently HDS, on room air, no chest pain at rest; ECG not acutely ischemic but some concern in V2 for Wellens type A, history of LAD disease w/ previous ISR. Admitted for ischemic workup.     Floor course:  Stress cMRI was obtained to work up patient's stable angina, which revealed LVEF 37%. There is near transmural LGE/scar noted at the level of the partial mid anteroseptal, apical septum, apical anterior walls and apex consistent with prior mid LAD infarct. Limited viable myocardium remains in the apical anterior, apical septum and apex. 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 LV thrombus measuring 5.3 x 6.8 mm appreciated at the level of the distal apical septal wall.     We do not see enough evidence of new obstructive disease to repeat coronary angiogram particularly in the setting of normal troponins. We do suspect he has an area of infarct distal to the site of one of his prior LAD stents likely resulted in the LV thrombus. Patient is agreeable to starting Eliquis 5mg BID for LV thrombus. Initiated Imdur 30mg for angina and Jardiance 10mg for now moderately reduced EF.   Scheduled to follow up with his cardiologist, Dr. Delgado  "Rajendra on 8/8/24  Patient requested to meet with SW prior to discharge for issues at home. Resources were given.    Medications were brought to bedside via meds to beds prior to discharge.    Discharge weight: 168 kg    After all labs and VS were reviewed the decision was made that the patient was medically stable for discharge.  The patient was discharged in satisfactory condition.    More than 60 minutes were spent in coordinating patient discharge.      Home Medications     Medication List      START taking these medications     Eliquis 5 mg tablet; Generic drug: apixaban; Take 1 tablet (5 mg) by   mouth 2 times a day.   isosorbide mononitrate ER 30 mg 24 hr tablet; Commonly known as: Imdur;   Take 1 tablet (30 mg) by mouth once daily. Do not crush or chew.   Jardiance 10 mg; Generic drug: empagliflozin; Take 1 tablet (10 mg) by   mouth once daily.     CHANGE how you take these medications     nitroglycerin 0.4 mg SL tablet; Commonly known as: Nitrostat; Place 1   tablet (0.4 mg) under the tongue every 5 minutes if needed for chest   pain.; What changed: medication strength, how much to take, when to take   this, reasons to take this, additional instructions     CONTINUE taking these medications     * albuterol 2.5 mg /3 mL (0.083 %) nebulizer solution   * albuterol 90 mcg/actuation inhaler; Inhale 1-2 puffs if needed   (asthma, cough) every 4 to 6 hours as needed   amLODIPine 10 mg tablet; Commonly known as: Norvasc; Take 1 tablet (10   mg) by mouth once daily.   aspirin 81 mg EC tablet; Take 1 tablet (81 mg) by mouth once daily.   BD Insulin Syringe Ultra-Fine 0.5 mL 30 gauge x 1/2\" syringe; Generic   drug: insulin syringe-needle U-100; USE TO INJECT SUBCUTANEOUSLY TWICE A   DAY   Brilinta 90 mg tablet; Generic drug: ticagrelor; Take 1 tablet (90 mg)   by mouth 2 times a day.   docusate sodium 100 mg capsule; Commonly known as: Colace; Take 1   capsule (100 mg) by mouth 2 times a day as needed for " "constipation.   Entresto 49-51 mg tablet; Generic drug: sacubitriL-valsartan; Take 1   tablet by mouth 2 times a day.   FeroSuL tablet; Generic drug: ferrous sulfate (325 mg ferrous sulfate);   Take 1 tablet by mouth once daily with breakfast.   furosemide 40 mg tablet; Commonly known as: Lasix; Take 1 tablet (40 mg)   by mouth once daily.   HumuLIN 70/30 U-100 Insulin 100 unit/mL (70-30) injection; Generic drug:   insulin NPH and regular human; INJECT 70 UNITS SUBCUTANEOUSLY BEFORE   BREAKFAST AND 70 UNITS BEFORE DINNER AS DIRECTED   insulin syringe-needle U-100 1 mL 30 gauge x 1/2\" syringe; Use 2 daily   for insulin injections   metoprolol succinate XL 50 mg 24 hr tablet; Commonly known as:   Toprol-XL; Take 1 tablet (50 mg) by mouth once daily.   montelukast 10 mg tablet; Commonly known as: Singulair; Take 1 tablet   (10 mg) by mouth once daily.   polyethylene glycol 17 gram/dose powder; Commonly known as: Glycolax,   Miralax; Take 17 g by mouth once daily as needed (constipation). Mix in 8   ounces of water, juice, or tea and drink   pravastatin 80 mg tablet; Commonly known as: Pravachol; Take 1 tablet   (80 mg) by mouth once daily.   solifenacin 10 mg tablet; Commonly known as: VESIcare; Take 1 tablet (10   mg) by mouth once daily. Swallow tablet whole; do not crush, chew, or   split.   tamsulosin 0.4 mg 24 hr capsule; Commonly known as: Flomax; Take 2   capsules (0.8 mg) by mouth once daily 30 minutes after same meal or at   bedtime with snack  * This list has 2 medication(s) that are the same as other medications   prescribed for you. Read the directions carefully, and ask your doctor or   other care provider to review them with you.     STOP taking these medications     methocarbamol 500 mg tablet; Commonly known as: Robaxin   tadalafil 10 mg tablet; Commonly known as: Cialis       Outpatient Follow-Up  Future Appointments   Date Time Provider Department Center   8/8/2024  9:40 AM Danny Drew, DO " [id number] [facility]   9/18/2024  8:30 AM Michael Johnson PA-C [id number] [facility]   11/12/2024  8:30 AM Dilcia Rivera MD [id number] [facility]   12/3/2024  9:00 AM Arlyn Haywood MD [id number] [facility]       Sanjay Elena PA-C    Discharge time 44 minutes    Briefly patient was sent to ED from outpatient clinic for chest pain which he has on/off, not clearly cardiac, given negative troponins we elected to start with stress MRI which showed slight drop in LVEF relative to prior TTE, he was continued on BB/ARNI we added SGLT2i (attempted to call endocrinologist but no call back), Spirolactone should be started but we were not sure why this has been stopped.   There was no significant ischemia other than what appears to carmelita-infarct ischemia in distal LAD territory (site of prior PCI)   Finally he was noted to have LV thrombus and was started on DOAC, unclear chronicity since this may have been missed on prior TTE, he will have cardiology follow up in 2 weeks

## 2024-07-19 NOTE — CARE PLAN
Problem: Fall/Injury  Goal: Not fall by end of shift  7/19/2024 0435 by Myrtle Ramesh, JEIMY  Outcome: Progressing  7/19/2024 0435 by Myrtle Ramesh RN  Outcome: Progressing  Goal: Be free from injury by end of the shift  7/19/2024 0435 by Myrtle Ramesh RN  Outcome: Progressing  7/19/2024 0435 by Myrtle Ramesh RN  Outcome: Progressing  Goal: Verbalize understanding of personal risk factors for fall in the hospital  7/19/2024 0435 by Myrtle Ramesh, JEIMY  Outcome: Progressing  7/19/2024 0435 by Myrtle Ramesh RN  Outcome: Progressing  Goal: Verbalize understanding of risk factor reduction measures to prevent injury from fall in the home  7/19/2024 0435 by Myrtle Ramesh, JEIMY  Outcome: Progressing  7/19/2024 0435 by Myrtle Ramesh, JEIMY  Outcome: Progressing  Goal: Use assistive devices by end of the shift  7/19/2024 0435 by Myrtle Ramesh, JEIMY  Outcome: Progressing  7/19/2024 0435 by Myrtle Ramesh, JEIMY  Outcome: Progressing  Goal: Pace activities to prevent fatigue by end of the shift  7/19/2024 0435 by Myrtle Ramesh, JEIMY  Outcome: Progressing  7/19/2024 0435 by Myrtle Ramesh RN  Outcome: Progressing     Problem: ACS/CP/NSTEMI/STEMI  Goal: Chest pain managed (free from pain or at acceptable level)  7/19/2024 0435 by Myrtle Ramesh, JEIMY  Outcome: Progressing  7/19/2024 0435 by Myrtle Ramesh, JEIMY  Outcome: Progressing  Goal: Lab values return to normal range  7/19/2024 0435 by Myrtle Ramesh, JEIMY  Outcome: Progressing  7/19/2024 0435 by Myrtle Ramesh, JEIMY  Outcome: Progressing  Goal: Promote self management  7/19/2024 0435 by Myrtle Ramesh, JEIMY  Outcome: Progressing  7/19/2024 0435 by Myrtle Ramesh, JEIMY  Outcome: Progressing  Goal: Serial ECG will return to baseline  7/19/2024 0435 by Myrtle Ramesh RN  Outcome: Progressing  7/19/2024 0435 by Myrtle Ramesh RN  Outcome: Progressing  Goal: Verbalize understanding of procedures/devices  7/19/2024 0435 by  Myrtle Ramesh RN  Outcome: Progressing  7/19/2024 0435 by Myrtle Ramesh, JEIMY  Outcome: Progressing  Goal: Wean vasopressors/achieve hemodynamic stability  7/19/2024 0435 by Myrtle Ramesh, JEIMY  Outcome: Progressing  7/19/2024 0435 by Myrtle Ramesh RN  Outcome: Progressing     Problem: Arrythmia/Dysrhythmia  Goal: Lab values return to normal range  7/19/2024 0435 by Myrtle Ramesh, JEIMY  Outcome: Progressing  7/19/2024 0435 by Myrtle Ramesh RN  Outcome: Progressing  Goal: No evidence of post procedure complications  7/19/2024 0435 by Myrtle Ramesh, JEIMY  Outcome: Progressing  7/19/2024 0435 by Myrtle Ramesh RN  Outcome: Progressing  Goal: Promote self management  7/19/2024 0435 by Myrtle Ramesh, JEIMY  Outcome: Progressing  7/19/2024 0435 by Myrtle Ramesh, JEIMY  Outcome: Progressing  Goal: Serial ECG will return to baseline  7/19/2024 0435 by Myrtle Ramesh RN  Outcome: Progressing  7/19/2024 0435 by Myrtle Ramesh RN  Outcome: Progressing  Goal: Verbalize understanding of procedures/devices  7/19/2024 0435 by Myrtle Ramesh, JEIMY  Outcome: Progressing  7/19/2024 0435 by Myrtle Ramesh, JEIMY  Outcome: Progressing  Goal: Vital signs return to baseline  7/19/2024 0435 by Myrtle Ramesh, JEIMY  Outcome: Progressing  7/19/2024 0435 by Myrtle Ramesh RN  Outcome: Progressing  Goal: Care and maintenance of device (specify)  7/19/2024 0435 by Myrtle Ramesh, JEIMY  Outcome: Progressing  7/19/2024 0435 by Myrtle Ramesh RN  Outcome: Progressing     Problem: Cardiac catheterization  Goal: Free from dysrhythmias  7/19/2024 0435 by Myrtle Ramesh, JEIMY  Outcome: Progressing  7/19/2024 0435 by Myrtle Ramesh, JEIMY  Outcome: Progressing  Goal: Free from pain  7/19/2024 0435 by Myrtle Ramesh RN  Outcome: Progressing  7/19/2024 0435 by Myrtle Ramesh, JEIMY  Outcome: Progressing  Goal: No evidence of post procedure complications  7/19/2024 0435 by Myrtle Ramesh,  RN  Outcome: Progressing  7/19/2024 0435 by Myrtle Ramesh, RN  Outcome: Progressing  Goal: Promote self management  7/19/2024 0435 by Myrtle Ramesh, RN  Outcome: Progressing  7/19/2024 0435 by Myrtle Ramesh, RN  Outcome: Progressing  Goal: Verbalize understanding of procedure  7/19/2024 0435 by Myrtle Ramesh, RN  Outcome: Progressing  7/19/2024 0435 by Myrtle Ramesh, RN  Outcome: Progressing  Goal: Care and maintenance of device (specify)  7/19/2024 0435 by Myrtle Ramesh, RN  Outcome: Progressing  7/19/2024 0435 by Myrtle Ramesh, RN  Outcome: Progressing     Problem: Hypertensive Emergency/Crisis  Goal: Blood pressure gradually reduced to goal range  7/19/2024 0435 by Myrtle Ramesh, RN  Outcome: Progressing  7/19/2024 0435 by Myrtle Ramesh, RN  Outcome: Progressing  Goal: Free from signs of organ damage  7/19/2024 0435 by Myrtle Ramesh, RN  Outcome: Progressing  7/19/2024 0435 by Myrtle Ramesh, RN  Outcome: Progressing  Goal: Lab values return to normal range  7/19/2024 0435 by Myrtle Ramesh, RN  Outcome: Progressing  7/19/2024 0435 by Myrtle Ramesh, RN  Outcome: Progressing  Goal: Promote self management  7/19/2024 0435 by Myrtle Ramesh, RN  Outcome: Progressing  7/19/2024 0435 by Myrtle Ramesh, RN  Outcome: Progressing

## 2024-07-23 LAB
ATRIAL RATE: 72 BPM
P AXIS: 54 DEGREES
P OFFSET: 151 MS
P ONSET: 110 MS
PR INTERVAL: 226 MS
Q ONSET: 223 MS
QRS COUNT: 12 BEATS
QRS DURATION: 72 MS
QT INTERVAL: 358 MS
QTC CALCULATION(BAZETT): 392 MS
QTC FREDERICIA: 380 MS
R AXIS: -25 DEGREES
T AXIS: 49 DEGREES
T OFFSET: 402 MS
VENTRICULAR RATE: 72 BPM

## 2024-07-24 PROCEDURE — RXMED WILLOW AMBULATORY MEDICATION CHARGE

## 2024-07-26 ENCOUNTER — PHARMACY VISIT (OUTPATIENT)
Dept: PHARMACY | Facility: CLINIC | Age: 64
End: 2024-07-26
Payer: MEDICAID

## 2024-07-29 DIAGNOSIS — J45.20 MILD INTERMITTENT ASTHMA, UNSPECIFIED WHETHER COMPLICATED (HHS-HCC): Primary | ICD-10-CM

## 2024-07-29 DIAGNOSIS — E11.3293 TYPE 2 DIABETES MELLITUS WITH BOTH EYES AFFECTED BY MILD NONPROLIFERATIVE RETINOPATHY WITHOUT MACULAR EDEMA, WITH LONG-TERM CURRENT USE OF INSULIN (MULTI): ICD-10-CM

## 2024-07-29 DIAGNOSIS — I50.9 CONGESTIVE HEART FAILURE, UNSPECIFIED HF CHRONICITY, UNSPECIFIED HEART FAILURE TYPE (MULTI): ICD-10-CM

## 2024-07-29 DIAGNOSIS — Z79.4 TYPE 2 DIABETES MELLITUS WITH BOTH EYES AFFECTED BY MILD NONPROLIFERATIVE RETINOPATHY WITHOUT MACULAR EDEMA, WITH LONG-TERM CURRENT USE OF INSULIN (MULTI): ICD-10-CM

## 2024-08-07 ENCOUNTER — APPOINTMENT (OUTPATIENT)
Dept: PHARMACY | Facility: HOSPITAL | Age: 64
End: 2024-08-07
Payer: COMMERCIAL

## 2024-08-08 ENCOUNTER — APPOINTMENT (OUTPATIENT)
Dept: CARDIOLOGY | Facility: CLINIC | Age: 64
End: 2024-08-08
Payer: COMMERCIAL

## 2024-08-19 PROCEDURE — RXMED WILLOW AMBULATORY MEDICATION CHARGE

## 2024-08-21 PROCEDURE — RXMED WILLOW AMBULATORY MEDICATION CHARGE

## 2024-08-22 ENCOUNTER — PHARMACY VISIT (OUTPATIENT)
Dept: PHARMACY | Facility: CLINIC | Age: 64
End: 2024-08-22
Payer: MEDICAID

## 2024-08-26 ENCOUNTER — PHARMACY VISIT (OUTPATIENT)
Dept: PHARMACY | Facility: CLINIC | Age: 64
End: 2024-08-26
Payer: MEDICAID

## 2024-08-26 PROCEDURE — RXMED WILLOW AMBULATORY MEDICATION CHARGE

## 2024-08-27 ENCOUNTER — ANCILLARY PROCEDURE (OUTPATIENT)
Dept: CARDIOLOGY | Facility: CLINIC | Age: 64
End: 2024-08-27
Payer: COMMERCIAL

## 2024-08-27 ENCOUNTER — OFFICE VISIT (OUTPATIENT)
Dept: CARDIOLOGY | Facility: CLINIC | Age: 64
End: 2024-08-27
Payer: COMMERCIAL

## 2024-08-27 VITALS
OXYGEN SATURATION: 98 % | SYSTOLIC BLOOD PRESSURE: 124 MMHG | WEIGHT: 315 LBS | BODY MASS INDEX: 40.43 KG/M2 | HEIGHT: 74 IN | DIASTOLIC BLOOD PRESSURE: 76 MMHG

## 2024-08-27 DIAGNOSIS — I25.10 ARTERIOSCLEROTIC CARDIOVASCULAR DISEASE: ICD-10-CM

## 2024-08-27 DIAGNOSIS — I10 BENIGN ESSENTIAL HYPERTENSION: Primary | ICD-10-CM

## 2024-08-27 DIAGNOSIS — I51.89 LEFT VENTRICULAR SYSTOLIC DYSFUNCTION, NYHA CLASS 3: ICD-10-CM

## 2024-08-27 DIAGNOSIS — E78.01 FAMILIAL HYPERCHOLESTEROLEMIA: ICD-10-CM

## 2024-08-27 DIAGNOSIS — I51.3 LV (LEFT VENTRICULAR) MURAL THROMBUS: ICD-10-CM

## 2024-08-27 PROBLEM — R07.9 CHEST PAIN: Status: RESOLVED | Noted: 2024-07-18 | Resolved: 2024-08-27

## 2024-08-27 PROBLEM — R07.9 CHEST PAIN, UNSPECIFIED TYPE: Status: RESOLVED | Noted: 2024-07-17 | Resolved: 2024-08-27

## 2024-08-27 PROCEDURE — 3078F DIAST BP <80 MM HG: CPT | Performed by: INTERNAL MEDICINE

## 2024-08-27 PROCEDURE — 3048F LDL-C <100 MG/DL: CPT | Performed by: INTERNAL MEDICINE

## 2024-08-27 PROCEDURE — 3052F HG A1C>EQUAL 8.0%<EQUAL 9.0%: CPT | Performed by: INTERNAL MEDICINE

## 2024-08-27 PROCEDURE — 99214 OFFICE O/P EST MOD 30 MIN: CPT | Performed by: INTERNAL MEDICINE

## 2024-08-27 PROCEDURE — 1036F TOBACCO NON-USER: CPT | Performed by: INTERNAL MEDICINE

## 2024-08-27 PROCEDURE — 93005 ELECTROCARDIOGRAM TRACING: CPT

## 2024-08-27 PROCEDURE — 3008F BODY MASS INDEX DOCD: CPT | Performed by: INTERNAL MEDICINE

## 2024-08-27 PROCEDURE — 3074F SYST BP LT 130 MM HG: CPT | Performed by: INTERNAL MEDICINE

## 2024-08-27 PROCEDURE — 93010 ELECTROCARDIOGRAM REPORT: CPT | Performed by: INTERNAL MEDICINE

## 2024-08-27 ASSESSMENT — ENCOUNTER SYMPTOMS
LOSS OF SENSATION IN FEET: 0
DEPRESSION: 0
OCCASIONAL FEELINGS OF UNSTEADINESS: 0

## 2024-08-27 ASSESSMENT — PAIN SCALES - GENERAL: PAINLEVEL: 0-NO PAIN

## 2024-08-27 NOTE — ASSESSMENT & PLAN NOTE
-- Current GDMT: 08/2024  ++ BB: Metoprolol succinate 50 mg daily  ++ ACE/ARB/ARNI: Sacubitril-valsartan 49/51 mg twice daily  ++ MRA: Not employed  ++ SGLT2-inhibitor: Empagliflozin 10 mg daily  ++Diuretic furosemide 40 mg daily

## 2024-08-27 NOTE — ASSESSMENT & PLAN NOTE
08/204 -- Remaining compliant with Apixaban 5 mg BID _ Ticagrelor   -- He has not had any bleeding issued or complications   -- Remain on OAC for life given the wall motions and milleu for Thrombus formation

## 2024-08-27 NOTE — PROGRESS NOTES
"Chief Complaint:   No chief complaint on file.     History Of Present Illness:    Dallas Ruiz Jr. is a 64 y.o. male presenting with a pertinent medical history of PMH of CAD (s/p SILVINO to distal RCA in 3/2013 and proximal RCA in 7/2014, prox and n=mid LAD in 10/2014), HTN, HF (EF 25-30% and diastolic dysfx, Recovered to 40-45% on 02/2018) poorly controlled DM c/b toe amputation and neuropathy, HLD who seen today in the cardiology clinic for follow-up        He was last seen in January 2021. He was supposed to have return for routine follow-up. He did not. He was admitted in October 2021 with COVID-19 respiratory issues. Then, he was readmitted in December 2021 with acute on chronic heart failure exacerbation. Since roughly October, he reports that he has been having persistent shortness of breath. He has not followed up with anyone. He reports that he has been out of medication since at least December 2021. Again, he has not sought to have refills provided for him. Today he presents to cardiology in somewhat distress. Reports worsening shortness of breath. Reports dyspnea on exertion. Reports orthopnea. Expressed her concern that maybe he needs to be admitted to which she declines. He is agreeable to going to the lab for blood work done.      ( 1/13/2022) He has been absent for a while from all medical care because \"I just didn;t care\" He has been out of medications for \"Months\" t States that he has been \"going through a lot\" and just didn't care. He admits tepression, but contracts for safety. States that he had previously asked for help with depression, but nothing came of it. He is agreeable to referral today. Reports that he has been having a hard time with living arrangemnets and costs.      2/16/2023 -- He returns for a virtual follow up. H he had originally been planned for an office follow-up, but unfortunately, due to ride availability was not able to attend in person. As such, appointment was changed to " "telehealth to allow continued follow-up and to avoid a similar occurrence where he becomes lost to medical care for a protracted period of time. Since his last appointment, he reports that he has been feeling well. He has been compliant with medications. He does note that maybe his medications are little bit too \"too much\" for him as he reports lightheadedness, dizziness on occasions when rising from a seated position to standing position. He states that his primary doctor said that his \"blood pressure falls\" at times.    2/20/2024 -- He returns for follow up. He has been absent for a year again. He \"was going through so much\"  and \"Needed to get my head straight\"  States that he has been fighting for 2 years to get into new housing related to [employer], moved into a new residence in November 2023.  He reports that he has been out of his medications for several months, reported issues with his Doctor, His Pharmacy etc.. reports that this is getting fixed.  He notes that he again has had multiple months of not taking his medication.  It is unclear whether or not he is having cardiovascular symptomatology is he does not do much, continues to work with therapist to try and help him get \"really\".    8/27/2024 --> He returns foir follow up.  He was [place] [facility] [place] symptoms suggestrive of unstable aingina. He underwent cMRI stress Testing which reviewed ischemic ardiac         Patient denies chest pain and angina. Pt reports shortness of breath, dyspnea on exertion, orthopnea, and paroxysmal nocturnal dyspnea. Pt reports worsening lower extremity edema. Pt denies palpitations or syncope. No recent falls. No fever or chills. No cough. No change in bowel or bladder habits. No travel. No sick contacts. No recent travel     Past medical history: As above.  Medications: Reviewed.  Allergies: Reviewed.  Social history: Patient denies smoking, alcohol abuse, or illicit drug use.  Family history: No sudden cardiac death or " premature coronary artery disease.   .     Last Recorded Vitals:  There were no vitals filed for this visit.      Past Medical History:  He has a past medical history of Age-related nuclear cataract, bilateral (11/13/2015), Calculus of ureter (02/20/2020), Encounter for screening for malignant neoplasm of prostate (01/28/2022), Encounter for screening for other disorder (02/13/2022), Gout, unspecified (01/19/2019), Morbid (severe) obesity due to excess calories (Multi), Old myocardial infarction, Personal history of other diseases of the circulatory system (02/06/2019), Personal history of other diseases of the circulatory system (03/06/2019), Personal history of other diseases of the musculoskeletal system and connective tissue, Personal history of other diseases of the respiratory system (10/17/2014), Personal history of other endocrine, nutritional and metabolic disease (03/26/2014), Personal history of other mental and behavioral disorders, Primary open-angle glaucoma, unspecified eye, stage unspecified (11/13/2015), Pure hypercholesterolemia, unspecified, and Unqualified visual loss, both eyes (11/14/2014).    Past Surgical History:  He has a past surgical history that includes Hernia repair (01/17/2014); Coronary angioplasty with stent (03/26/2014); Other surgical history (10/17/2014); and CT angio abdomen pelvis w and or wo IV IV contrast (2/12/2020).      Social History:  He reports that he quit smoking about 36 years ago. His smoking use included cigarettes. He has been exposed to tobacco smoke. He has never used smokeless tobacco. He reports that he does not drink alcohol and does not use drugs.    Family History:  Family History   Problem Relation Name Age of Onset    Diabetes Mother      Breast cancer Mother      Heart attack Father      Heart attack Brother          Allergies:  Amoxicillin and Bupropion    Outpatient Medications:  Current Outpatient Medications   Medication Instructions    albuterol 90  "mcg/actuation inhaler Inhale 1-2 puffs if needed (asthma, cough) every 4 to 6 hours as needed    albuterol 2.5 mg, nebulization, Every 6 hours PRN    amLODIPine (NORVASC) 10 mg, oral, Daily    aspirin 81 mg, oral, Daily    Brilinta 90 mg, oral, 2 times daily    docusate sodium (COLACE) 100 mg, oral, 2 times daily PRN    Eliquis 5 mg, oral, 2 times daily    ferrous sulfate, 325 mg ferrous sulfate, tablet 1 tablet, oral, Daily with breakfast    furosemide (LASIX) 40 mg, oral, Daily    insulin NPH and regular human (HumuLIN 70/30 U-100 Insulin) 100 unit/mL (70-30) injection INJECT 70 UNITS SUBCUTANEOUSLY BEFORE BREAKFAST AND 70 UNITS BEFORE DINNER AS DIRECTED    insulin syringe-needle U-100 1 mL 30 gauge x 1/2\" syringe Use 2 daily for insulin injections    insulin syringe-needle U-100 30G X 1/2\" 0.5 mL syringe USE TO INJECT SUBCUTANEOUSLY TWICE A DAY    isosorbide mononitrate ER (IMDUR) 30 mg, oral, Daily, Do not crush or chew.    Jardiance 10 mg, oral, Daily    metoprolol succinate XL (TOPROL-XL) 50 mg, oral, Daily    montelukast (SINGULAIR) 10 mg, oral, Daily    nitroglycerin (NITROSTAT) 0.4 mg, sublingual, Every 5 min PRN    polyethylene glycol (GLYCOLAX, MIRALAX) 17 g, oral, Daily PRN, Mix in 8 ounces of water, juice, or tea and drink    pravastatin (PRAVACHOL) 80 mg, oral, Daily    sacubitriL-valsartan (Entresto) 49-51 mg tablet 1 tablet, oral, 2 times daily    solifenacin (VESICARE) 10 mg, oral, Daily, Swallow tablet whole; do not crush, chew, or split.    tamsulosin (Flomax) 0.4 mg 24 hr capsule Take 2 capsules (0.8 mg) by mouth once daily 30 minutes after same meal or at bedtime with snack       Physical Exam:  Constitutional:       Appearance: Not in distress.   Neck:      Thyroid: Thyroid normal.      Vascular: JVD normal.   Pulmonary:      Effort: Increased respiratory effort.      Breath sounds: Normal breath sounds.   Chest:      Chest wall: Not tender to palpatation.   Cardiovascular:      Normal rate. " Regular rhythm. Normal S1. Normal S2.       S3 Gallop.  No click. No rub.   Pulses:     Intact distal pulses.   Edema:     Ankle: bilateral 1+ edema of the ankle.  Abdominal:      General: Bowel sounds are normal.   Musculoskeletal: Normal range of motion. Skin:     General: Skin is warm.   Neurological:      General: No focal deficit present.      Mental Status: Alert and oriented to person, place and time.   Psychiatric:         Attention and Perception: Attention normal.         Mood and Affect: Affect is flat.         Speech: Speech normal.         Behavior: Behavior is withdrawn.            Last Labs:  CBC -  Lab Results   Component Value Date    WBC 6.2 07/18/2024    HGB 12.9 (L) 07/18/2024    HCT 40.9 (L) 07/18/2024    MCV 86 07/18/2024     07/18/2024       CMP -  Lab Results   Component Value Date    CALCIUM 9.2 07/18/2024    PHOS 3.1 07/18/2024    PROT 6.8 07/17/2024    ALBUMIN 3.8 07/18/2024    AST 21 07/17/2024    ALT 18 07/17/2024    ALKPHOS 73 07/17/2024    BILITOT 0.4 07/17/2024       LIPID PANEL -   Lab Results   Component Value Date    CHOL 122 07/17/2024    TRIG 186 (H) 07/17/2024    HDL 30.8 07/17/2024    CHHDL 4.0 07/17/2024    LDLF 54 01/28/2022    VLDL 37 07/17/2024    NHDL 91 07/17/2024       RENAL FUNCTION PANEL -   Lab Results   Component Value Date    GLUCOSE 165 (H) 07/18/2024     07/18/2024    K 4.3 07/18/2024     07/18/2024    CO2 28 07/18/2024    ANIONGAP 13 07/18/2024    BUN 20 07/18/2024    CREATININE 1.13 07/18/2024    GFRMALE 65 03/31/2022    CALCIUM 9.2 07/18/2024    PHOS 3.1 07/18/2024    ALBUMIN 3.8 07/18/2024        Lab Results   Component Value Date    BNP 13 07/17/2024    HGBA1C 8.6 (H) 07/09/2024    HGBA1C 7.1 (H) 10/04/2019       Last Cardiology Tests:  ECG:  In Office EKG 2/20/2024 --> Sinus Rhythm with PAC and Anterolateral Infarction Pattern     Echo:  Echocardiogram 2/2023   1. Poorly visualized anatomical structures due to suboptimal image quality.    2. Left ventricular systolic function is low normal with a 50-55% estimated ejection fraction.   3. No left ventricular thrombus visualized.   4. Within the limitations of the study, no significant changes noted.    Echocardiogram 10/2021   1. The left ventricular systolic function is low normal with a 50-55% estimated ejection fraction.   2. Spectral Doppler shows an impaired relaxation pattern of left ventricular diastolic filling.   3. The pulmonary artery is not well visualized.    Cath:  Coronary Angiography:  The coronary circulation is right dominant.     Left Main Coronary Artery:  The left main coronary artery is a normal caliber vessel. The left main arises normally from the left coronary sinus of Valsalva and bifurcates into the LAD and circumflex coronary arteries. The left main coronary artery showed no significant disease or stenosis greater than 30%.     Left Anterior Descending Coronary Artery Distribution:  The left anterior descending coronary artery is a normal caliber vessel. The LAD arises normally from the left main coronary artery. The LAD is a medium caliber vessel that was previously 100% occluded in the middle third due to ISR. It has now recanalized. There are stents in the proximal and middle thirds of the vesselthat have severe in-stent restenosis along their entire length. There is a jailed first diagonal branch. There is severe diffuse disease in the distal and apical LAD.     Circumflex Coronary Artery Distribution:  The circumflex coronary artery is a normal caliber vessel. The circumflex arises normally from the left main coronary artery and terminates in the AV groove. The circumflex revealed atherosclerotic disease. The proximal circumflex coronary artery showed 90% stenosis.     Ramus Intermedius:  The ramus intermedius arises normally from the left main coronary artery. The ramus intermedius showed mild irregularities.     Right Coronary Artery Distribution:     The right  coronary artery is a normal caliber vessel. The RCA arises normally from the right sinus of Valsalva. The RCA showed moderate irregularities. The mid right coronary artery showed 60% stenosis.     Coronary Interventions:  Angiography reveals a 90% stenosis of the proximal circumflex. Pre-intervention VILMA flow was 3. Percutaneous coronary intervention was performed within the proximal circumflex. The vessel was pre-dilated using a non-compliant balloon 2.5 mm x 12 mm at 16 BAILEY. Resolute [place] drug-eluting stent 3.0 mm x 18 mm was advanced to the lesion and implanted at 20 BAILEY. The stent was post dilated using a non-compliant balloon 3.0 mm x 12 mm at 26 BAILEY. Additional dilatation was performed using a non-compliant balloon 3.5 mm x 6 mm at 16 BAILEY. The stenosis was successfully reduced from 90% to <10%. Post-intervention VILMA flow was 3.     Fractional Flow Reserve (FFR) of the right coronary artery lesion was performed. Baseline Pd/Pa was 1.0. The FFR after hyperemia 140 mcg/kg/min, was 0.99.     Coronary Lesion Summary:  Vessel       Stenosis   Vessel Segment  Circumflex 90% stenosis    proximal  RCA        60% stenosis      mid     Stress Test:  No results found for this or any previous visit from the past 1095 days.    =  Cardiac Imaging:  [facility] MRI Stress Test 07/2024   IMPRESSION:  1. Ischemic cardiomyopathy. LVEF 37%. Regional wall motion  abnormalities noted in the LAD territory as described above.  2. There is near transmural LGE/scar noted at the level of the  partial mid anteroseptal, apical septum, apical anterior walls and  apex consistent with prior mid LAD infarct. Limited viable myocardium  remains in the apical anterior, apical septum and apex.  3. There is reversible perfusion defect appreciated at the level of  the apical anteroseptal wall consistent with carmelita-infarct ischemia in  the distal LAD territory.  4. There is a LV thrombus measuring 5.3 x 6.8 mm appreciated at the  level of the distal  apical septal wall.  3. No evidence of myocardial inflammation on T2-weighted images. ECV  normal at 26%.  4. Normal RV function.            Lab review: I have personally reviewed the laboratory result(s)   Diagnostic review: I have personally reviewed the result(s) of the EKG and Echocardiogram .   Imaging review: I have  personally reviewed the result(s) cath report    Assessment/Plan   Problem List Items Addressed This Visit       Arteriosclerotic cardiovascular disease    Overview     Vessel       Stenosis   Vessel Segment  LAD -- 100% occluded in the middle third due to ISR. It has now recanalized. There are stents in the proximal and middle thirds of the vesselthat have severe in-stent restenosis along their entire length. There is a jailed first diagonal branch.   Circumflex 90% stenosis    proximal -->  Resolute [place] drug-eluting stent 3.0 mm x 18 mm was advanced to the lesion and implanted at 20 BAILEY  RCA        60% stenosis      mid            Current Assessment & Plan     Severe LAD disease as noted including recannulized middle  with severe in-stent restenosis as well as severe proximal, mid ISR stents and a jailed D1  --Cardiac MRI 7/2024 showing near transmural scar beginning at the level of the partial mid anteroseptum and apical septum and apical anterior and apex with limited viable myocardium in the remaining area and interval development of an LV thrombus  -- Medical Management of Chronic Ischemic Disease   ++ Apixaban 5 mg BID  ( LV Thrombus )   ++ Brilinta 90 mg BID ( Severe CAD / ISR )   ++ Isosorbide Mononitrate 30 mg   ++ Metoprolol Succinate 50 mg   += Pravastatin 80 mg   ++ ASA          Benign essential hypertension - Primary    Overview     Currently controlled blood pressure goal less than 120 mmHg         Relevant Orders    ECG 12 lead (Clinic Performed)    Hyperlipidemia    Overview     The ASCVD Risk score (Carin DK, et al., 2019) failed to calculate for the following reasons:     "The patient has a prior MI or stroke diagnosis    Lab Results   Component Value Date    CHOL 122 07/17/2024    CHOL 122 03/08/2024    CHOL 122 03/08/2024     Lab Results   Component Value Date    HDL 30.8 07/17/2024    HDL 32.6 03/08/2024    HDL 32.6 03/08/2024     Lab Results   Component Value Date    LDLCALC 54 07/17/2024    LDLCALC 72 03/08/2024     Lab Results   Component Value Date    TRIG 186 (H) 07/17/2024    TRIG 88 03/08/2024    TRIG 110 01/28/2022     No components found for: \"CHOLHDL\"           Current Assessment & Plan     Currently maintained on pravastatin 80 mg  --This has been effective when he is taking         Left ventricular systolic dysfunction, NYHA class 3    Overview     Ischemic Cardiomyopathy with Significant scar burden seen on cMRI 07/2024  -- Ischemic cardiomyopathy. LVEF 37%. Regional wall motion  abnormalities noted in the LAD territory as described above.  2. There is near transmural LGE/scar noted at the level of the  partial mid anteroseptal, apical septum, apical anterior walls and  apex consistent with prior mid LAD infarct. Limited viable myocardium  remains in the apical anterior, apical septum and apex.         Current Assessment & Plan     -- Current GDMT: 08/2024  ++ BB: Metoprolol succinate 50 mg daily  ++ ACE/ARB/ARNI: Sacubitril-valsartan 49/51 mg twice daily  ++ MRA: Not employed  ++ SGLT2-inhibitor: Empagliflozin 10 mg daily  ++Diuretic furosemide 40 mg daily           LV (left ventricular) mural thrombus    Overview     LV Thrombus 5.3 X 6.8 mm in the distal apical septal wall seen on Cardiac MRI,.  Occurring in the setting of Severe LAD Disease with Transmural infarction seen in the partial mid anteroseptal, apical septum, apical anterior walls and apex          Current Assessment & Plan     08/204 -- Remaining compliant with Apixaban 5 mg BID _ Ticagrelor   -- He has not had any bleeding issued or complications   -- Remain on OAC for life given the wall motions and " milleu for Thrombus formation

## 2024-08-27 NOTE — ASSESSMENT & PLAN NOTE
Severe LAD disease as noted including recannulized middle  with severe in-stent restenosis as well as severe proximal, mid ISR stents and a jailed D1  --Cardiac MRI 7/2024 showing near transmural scar beginning at the level of the partial mid anteroseptum and apical septum and apical anterior and apex with limited viable myocardium in the remaining area and interval development of an LV thrombus  -- Medical Management of Chronic Ischemic Disease   ++ Apixaban 5 mg BID  ( LV Thrombus )   ++ Brilinta 90 mg BID ( Severe CAD / ISR )   ++ Isosorbide Mononitrate 30 mg   ++ Metoprolol Succinate 50 mg   += Pravastatin 80 mg   ++ ASA

## 2024-08-28 LAB
ATRIAL RATE: 72 BPM
P AXIS: 36 DEGREES
P OFFSET: 186 MS
P ONSET: 119 MS
PR INTERVAL: 198 MS
Q ONSET: 218 MS
QRS COUNT: 12 BEATS
QRS DURATION: 80 MS
QT INTERVAL: 372 MS
QTC CALCULATION(BAZETT): 407 MS
QTC FREDERICIA: 395 MS
R AXIS: -15 DEGREES
T AXIS: 61 DEGREES
T OFFSET: 404 MS
VENTRICULAR RATE: 72 BPM

## 2024-08-28 PROCEDURE — RXMED WILLOW AMBULATORY MEDICATION CHARGE

## 2024-09-03 PROCEDURE — RXMED WILLOW AMBULATORY MEDICATION CHARGE

## 2024-09-05 ENCOUNTER — PHARMACY VISIT (OUTPATIENT)
Dept: PHARMACY | Facility: CLINIC | Age: 64
End: 2024-09-05
Payer: MEDICAID

## 2024-09-18 ENCOUNTER — APPOINTMENT (OUTPATIENT)
Dept: UROLOGY | Facility: CLINIC | Age: 64
End: 2024-09-18
Payer: COMMERCIAL

## 2024-09-18 VITALS — HEIGHT: 74 IN | WEIGHT: 315 LBS | TEMPERATURE: 96.9 F | BODY MASS INDEX: 40.43 KG/M2

## 2024-09-18 DIAGNOSIS — N40.0 BENIGN PROSTATIC HYPERPLASIA, UNSPECIFIED WHETHER LOWER URINARY TRACT SYMPTOMS PRESENT: Primary | ICD-10-CM

## 2024-09-18 LAB
POC APPEARANCE, URINE: CLEAR
POC BILIRUBIN, URINE: NEGATIVE
POC BLOOD, URINE: ABNORMAL
POC COLOR, URINE: YELLOW
POC GLUCOSE, URINE: ABNORMAL MG/DL
POC KETONES, URINE: NEGATIVE MG/DL
POC LEUKOCYTES, URINE: NEGATIVE
POC NITRITE,URINE: NEGATIVE
POC PH, URINE: 5.5 PH
POC PROTEIN, URINE: NEGATIVE MG/DL
POC SPECIFIC GRAVITY, URINE: 1.01
POC UROBILINOGEN, URINE: 0.2 EU/DL

## 2024-09-18 PROCEDURE — 3048F LDL-C <100 MG/DL: CPT | Performed by: PHYSICIAN ASSISTANT

## 2024-09-18 PROCEDURE — 1036F TOBACCO NON-USER: CPT | Performed by: PHYSICIAN ASSISTANT

## 2024-09-18 PROCEDURE — 3008F BODY MASS INDEX DOCD: CPT | Performed by: PHYSICIAN ASSISTANT

## 2024-09-18 PROCEDURE — RXMED WILLOW AMBULATORY MEDICATION CHARGE

## 2024-09-18 PROCEDURE — 81002 URINALYSIS NONAUTO W/O SCOPE: CPT | Performed by: PHYSICIAN ASSISTANT

## 2024-09-18 PROCEDURE — 3052F HG A1C>EQUAL 8.0%<EQUAL 9.0%: CPT | Performed by: PHYSICIAN ASSISTANT

## 2024-09-18 PROCEDURE — 51798 US URINE CAPACITY MEASURE: CPT | Performed by: PHYSICIAN ASSISTANT

## 2024-09-18 PROCEDURE — 99213 OFFICE O/P EST LOW 20 MIN: CPT | Performed by: PHYSICIAN ASSISTANT

## 2024-09-18 RX ORDER — SOLIFENACIN SUCCINATE 10 MG/1
10 TABLET, FILM COATED ORAL DAILY
Qty: 90 TABLET | Refills: 3 | Status: SHIPPED | OUTPATIENT
Start: 2024-09-18 | End: 2025-09-18

## 2024-09-18 RX ORDER — TAMSULOSIN HYDROCHLORIDE 0.4 MG/1
0.8 CAPSULE ORAL DAILY
Qty: 180 CAPSULE | Refills: 3 | Status: SHIPPED | OUTPATIENT
Start: 2024-09-18 | End: 2025-09-18

## 2024-09-18 ASSESSMENT — ENCOUNTER SYMPTOMS
ALLERGIC/IMMUNOLOGIC NEGATIVE: 1
PSYCHIATRIC NEGATIVE: 1
EYES NEGATIVE: 1
DYSURIA: 0
NEUROLOGICAL NEGATIVE: 1
RESPIRATORY NEGATIVE: 1
CONSTITUTIONAL NEGATIVE: 1
GASTROINTESTINAL NEGATIVE: 1
ENDOCRINE NEGATIVE: 1
CARDIOVASCULAR NEGATIVE: 1
DIFFICULTY URINATING: 0
FREQUENCY: 1
HEMATOLOGIC/LYMPHATIC NEGATIVE: 1
HEMATURIA: 0

## 2024-09-18 ASSESSMENT — PAIN SCALES - GENERAL: PAINLEVEL: 0-NO PAIN

## 2024-09-18 NOTE — PROGRESS NOTES
Subjective   Patient ID: Dallas Ruiz Jr. is a 64 y.o. male who presents for Follow-up.  HPI  65 yo with BPH on Flomax 0.8mg, nephrolithiasis, OAB seen after trial of vesicare.    Patient is doing well. He reports improvement of urinary symptoms with decreased urinary frequency  last seen about two years ago seen after restarting flomax.    Patient reports urinary symptoms improved with combination of vesicare and flomax. He reports decreased urinary frequency, urgency and incontinence. He is not using any depends compared to 4. He reports nocturia x2 compared to every hour   He denies any side effects from vesicare    Patient was having chest pain when last seen. He went to the ED and was diagnosed with blood clots. He is under care of his cardiologist fr further management.       UA shows trace of blood intact    PSA July 2024  0.5  Review of Systems   Constitutional: Negative.    HENT: Negative.     Eyes: Negative.    Respiratory: Negative.     Cardiovascular: Negative.    Gastrointestinal: Negative.    Endocrine: Negative.    Genitourinary:  Positive for frequency and urgency. Negative for difficulty urinating, dysuria and hematuria.   Skin: Negative.    Allergic/Immunologic: Negative.    Neurological: Negative.    Hematological: Negative.    Psychiatric/Behavioral: Negative.         Objective   Physical Exam  Constitutional:       General: He is not in acute distress.     Appearance: Normal appearance.   HENT:      Head: Normocephalic and atraumatic.      Nose: Nose normal.      Mouth/Throat:      Mouth: Mucous membranes are moist.   Cardiovascular:      Rate and Rhythm: Normal rate.   Pulmonary:      Effort: Pulmonary effort is normal.   Abdominal:      General: Abdomen is flat.      Palpations: Abdomen is soft.   Musculoskeletal:      Cervical back: Normal range of motion.   Neurological:      Mental Status: He is alert.         Assessment/Plan   Problem List Items Addressed This Visit    None  Visit  Diagnoses         Codes    Benign prostatic hyperplasia, unspecified whether lower urinary tract symptoms present     N40.0    Relevant Medications    solifenacin (VESIcare) 10 mg tablet    tamsulosin (Flomax) 0.4 mg 24 hr capsule          Continue Flomax 0.8 mg and vesicare. I advised a trial of vesicare due to improved symptoms and lack of side effects.    Follow up in 4 months or sooner as needed         Michael Johnson PA-C 09/18/24 9:18 AM

## 2024-09-20 ENCOUNTER — PHARMACY VISIT (OUTPATIENT)
Dept: PHARMACY | Facility: CLINIC | Age: 64
End: 2024-09-20
Payer: MEDICAID

## 2024-09-30 PROCEDURE — RXMED WILLOW AMBULATORY MEDICATION CHARGE

## 2024-10-03 ENCOUNTER — PHARMACY VISIT (OUTPATIENT)
Dept: PHARMACY | Facility: CLINIC | Age: 64
End: 2024-10-03
Payer: MEDICAID

## 2024-10-17 PROCEDURE — RXMED WILLOW AMBULATORY MEDICATION CHARGE

## 2024-10-22 ENCOUNTER — PHARMACY VISIT (OUTPATIENT)
Dept: PHARMACY | Facility: CLINIC | Age: 64
End: 2024-10-22
Payer: MEDICAID

## 2024-10-22 PROCEDURE — RXMED WILLOW AMBULATORY MEDICATION CHARGE

## 2024-10-25 ENCOUNTER — PHARMACY VISIT (OUTPATIENT)
Dept: PHARMACY | Facility: CLINIC | Age: 64
End: 2024-10-25
Payer: MEDICAID

## 2024-10-28 PROCEDURE — RXMED WILLOW AMBULATORY MEDICATION CHARGE

## 2024-10-29 ENCOUNTER — PHARMACY VISIT (OUTPATIENT)
Dept: PHARMACY | Facility: CLINIC | Age: 64
End: 2024-10-29
Payer: MEDICAID

## 2024-11-12 ENCOUNTER — APPOINTMENT (OUTPATIENT)
Dept: PRIMARY CARE | Facility: CLINIC | Age: 64
End: 2024-11-12
Payer: COMMERCIAL

## 2024-11-12 ENCOUNTER — HOSPITAL ENCOUNTER (OUTPATIENT)
Dept: RADIOLOGY | Facility: CLINIC | Age: 64
Discharge: HOME | End: 2024-11-12
Payer: COMMERCIAL

## 2024-11-12 VITALS
DIASTOLIC BLOOD PRESSURE: 77 MMHG | SYSTOLIC BLOOD PRESSURE: 119 MMHG | HEART RATE: 93 BPM | BODY MASS INDEX: 40.43 KG/M2 | HEIGHT: 74 IN | WEIGHT: 315 LBS

## 2024-11-12 DIAGNOSIS — M54.2 NECK PAIN: ICD-10-CM

## 2024-11-12 DIAGNOSIS — S09.90XA CLOSED HEAD INJURY, INITIAL ENCOUNTER: Chronic | ICD-10-CM

## 2024-11-12 DIAGNOSIS — S19.9XXA INJURY OF NECK, INITIAL ENCOUNTER: ICD-10-CM

## 2024-11-12 DIAGNOSIS — I51.3 LV (LEFT VENTRICULAR) MURAL THROMBUS: ICD-10-CM

## 2024-11-12 DIAGNOSIS — I50.9 CONGESTIVE HEART FAILURE, UNSPECIFIED HF CHRONICITY, UNSPECIFIED HEART FAILURE TYPE: ICD-10-CM

## 2024-11-12 DIAGNOSIS — S09.90XA CLOSED HEAD INJURY, INITIAL ENCOUNTER: Primary | Chronic | ICD-10-CM

## 2024-11-12 DIAGNOSIS — I51.89 LEFT VENTRICULAR SYSTOLIC DYSFUNCTION, NYHA CLASS 3: ICD-10-CM

## 2024-11-12 DIAGNOSIS — Z79.4 TYPE 2 DIABETES MELLITUS WITH BOTH EYES AFFECTED BY MILD NONPROLIFERATIVE RETINOPATHY WITHOUT MACULAR EDEMA, WITH LONG-TERM CURRENT USE OF INSULIN: ICD-10-CM

## 2024-11-12 DIAGNOSIS — I10 BENIGN ESSENTIAL HYPERTENSION: ICD-10-CM

## 2024-11-12 DIAGNOSIS — E11.3293 TYPE 2 DIABETES MELLITUS WITH BOTH EYES AFFECTED BY MILD NONPROLIFERATIVE RETINOPATHY WITHOUT MACULAR EDEMA, WITH LONG-TERM CURRENT USE OF INSULIN: ICD-10-CM

## 2024-11-12 PROCEDURE — 72050 X-RAY EXAM NECK SPINE 4/5VWS: CPT

## 2024-11-12 PROCEDURE — 3048F LDL-C <100 MG/DL: CPT | Performed by: INTERNAL MEDICINE

## 2024-11-12 PROCEDURE — 3074F SYST BP LT 130 MM HG: CPT | Performed by: INTERNAL MEDICINE

## 2024-11-12 PROCEDURE — 3052F HG A1C>EQUAL 8.0%<EQUAL 9.0%: CPT | Performed by: INTERNAL MEDICINE

## 2024-11-12 PROCEDURE — 70450 CT HEAD/BRAIN W/O DYE: CPT

## 2024-11-12 PROCEDURE — 3008F BODY MASS INDEX DOCD: CPT | Performed by: INTERNAL MEDICINE

## 2024-11-12 PROCEDURE — 99215 OFFICE O/P EST HI 40 MIN: CPT | Performed by: INTERNAL MEDICINE

## 2024-11-12 PROCEDURE — 72050 X-RAY EXAM NECK SPINE 4/5VWS: CPT | Performed by: RADIOLOGY

## 2024-11-12 PROCEDURE — 3078F DIAST BP <80 MM HG: CPT | Performed by: INTERNAL MEDICINE

## 2024-11-12 PROCEDURE — 70450 CT HEAD/BRAIN W/O DYE: CPT | Performed by: RADIOLOGY

## 2024-11-12 PROCEDURE — 1036F TOBACCO NON-USER: CPT | Performed by: INTERNAL MEDICINE

## 2024-11-12 ASSESSMENT — ENCOUNTER SYMPTOMS: CONSTITUTIONAL NEGATIVE: 1
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
## 2024-11-13 PROCEDURE — RXMED WILLOW AMBULATORY MEDICATION CHARGE

## 2024-11-14 PROCEDURE — RXMED WILLOW AMBULATORY MEDICATION CHARGE

## 2024-11-15 ENCOUNTER — PHARMACY VISIT (OUTPATIENT)
Dept: PHARMACY | Facility: CLINIC | Age: 64
End: 2024-11-15
Payer: MEDICAID

## 2024-11-19 DIAGNOSIS — J30.9 ALLERGIC RHINITIS, UNSPECIFIED SEASONALITY, UNSPECIFIED TRIGGER: ICD-10-CM

## 2024-11-20 DIAGNOSIS — I20.89 STABLE ANGINA PECTORIS (CMS-HCC): ICD-10-CM

## 2024-11-20 DIAGNOSIS — E78.2 MIXED HYPERLIPIDEMIA: ICD-10-CM

## 2024-11-20 DIAGNOSIS — R07.89 ATYPICAL CHEST PAIN: ICD-10-CM

## 2024-11-20 DIAGNOSIS — I25.10 ARTERIOSCLEROTIC CARDIOVASCULAR DISEASE: ICD-10-CM

## 2024-11-20 DIAGNOSIS — I50.40 COMBINED SYSTOLIC AND DIASTOLIC CONGESTIVE HEART FAILURE, UNSPECIFIED HF CHRONICITY: ICD-10-CM

## 2024-11-20 DIAGNOSIS — I10 BENIGN ESSENTIAL HYPERTENSION: ICD-10-CM

## 2024-11-20 DIAGNOSIS — I51.3 LV (LEFT VENTRICULAR) MURAL THROMBUS: ICD-10-CM

## 2024-11-20 DIAGNOSIS — I50.22 CHRONIC SYSTOLIC CONGESTIVE HEART FAILURE: ICD-10-CM

## 2024-11-20 PROCEDURE — RXMED WILLOW AMBULATORY MEDICATION CHARGE

## 2024-11-20 RX ORDER — METOPROLOL SUCCINATE 50 MG/1
50 TABLET, EXTENDED RELEASE ORAL DAILY
Qty: 90 TABLET | Refills: 3 | OUTPATIENT
Start: 2024-11-20 | End: 2025-11-20

## 2024-11-20 RX ORDER — PRAVASTATIN SODIUM 80 MG/1
80 TABLET ORAL DAILY
Qty: 90 TABLET | Refills: 3 | Status: SHIPPED | OUTPATIENT
Start: 2024-11-20 | End: 2025-11-20

## 2024-11-20 RX ORDER — SACUBITRIL AND VALSARTAN 49; 51 MG/1; MG/1
1 TABLET, FILM COATED ORAL 2 TIMES DAILY
Qty: 180 TABLET | Refills: 3 | OUTPATIENT
Start: 2024-11-20 | End: 2025-11-20

## 2024-11-20 RX ORDER — PRAVASTATIN SODIUM 80 MG/1
80 TABLET ORAL DAILY
Qty: 90 TABLET | Refills: 3 | OUTPATIENT
Start: 2024-11-20 | End: 2025-11-20

## 2024-11-20 RX ORDER — FUROSEMIDE 40 MG/1
40 TABLET ORAL DAILY
Qty: 90 TABLET | Refills: 3 | OUTPATIENT
Start: 2024-11-20 | End: 2025-11-20

## 2024-11-20 RX ORDER — METOPROLOL SUCCINATE 50 MG/1
50 TABLET, EXTENDED RELEASE ORAL DAILY
Qty: 90 TABLET | Refills: 3 | Status: SHIPPED | OUTPATIENT
Start: 2024-11-20 | End: 2025-11-20

## 2024-11-20 RX ORDER — SACUBITRIL AND VALSARTAN 49; 51 MG/1; MG/1
1 TABLET, FILM COATED ORAL 2 TIMES DAILY
Qty: 180 TABLET | Refills: 3 | Status: SHIPPED | OUTPATIENT
Start: 2024-11-20 | End: 2025-11-20

## 2024-11-20 RX ORDER — AMLODIPINE BESYLATE 10 MG/1
10 TABLET ORAL DAILY
Qty: 90 TABLET | Refills: 3 | Status: SHIPPED | OUTPATIENT
Start: 2024-11-20 | End: 2025-11-20

## 2024-11-20 RX ORDER — ISOSORBIDE MONONITRATE 30 MG/1
30 TABLET, EXTENDED RELEASE ORAL DAILY
Qty: 90 TABLET | Refills: 3 | OUTPATIENT
Start: 2024-11-20 | End: 2025-11-20

## 2024-11-20 RX ORDER — AMLODIPINE BESYLATE 10 MG/1
10 TABLET ORAL DAILY
Qty: 90 TABLET | Refills: 3 | OUTPATIENT
Start: 2024-11-20 | End: 2025-11-20

## 2024-11-20 RX ORDER — ASPIRIN 81 MG/1
81 TABLET ORAL DAILY
Qty: 90 TABLET | Refills: 3 | OUTPATIENT
Start: 2024-11-20 | End: 2025-11-20

## 2024-11-20 RX ORDER — ISOSORBIDE MONONITRATE 30 MG/1
30 TABLET, EXTENDED RELEASE ORAL DAILY
Qty: 90 TABLET | Refills: 3 | Status: SHIPPED | OUTPATIENT
Start: 2024-11-20 | End: 2025-11-20

## 2024-11-20 RX ORDER — FUROSEMIDE 40 MG/1
40 TABLET ORAL DAILY
Qty: 90 TABLET | Refills: 3 | Status: SHIPPED | OUTPATIENT
Start: 2024-11-20 | End: 2025-11-20

## 2024-11-21 ENCOUNTER — PHARMACY VISIT (OUTPATIENT)
Dept: PHARMACY | Facility: CLINIC | Age: 64
End: 2024-11-21
Payer: MEDICAID

## 2024-11-21 PROCEDURE — RXMED WILLOW AMBULATORY MEDICATION CHARGE

## 2024-11-21 RX ORDER — MONTELUKAST SODIUM 10 MG/1
10 TABLET ORAL DAILY
Qty: 90 TABLET | Refills: 3 | Status: SHIPPED | OUTPATIENT
Start: 2024-11-21

## 2024-11-26 ENCOUNTER — PHARMACY VISIT (OUTPATIENT)
Dept: PHARMACY | Facility: CLINIC | Age: 64
End: 2024-11-26
Payer: MEDICAID

## 2024-12-03 ENCOUNTER — APPOINTMENT (OUTPATIENT)
Dept: OPHTHALMOLOGY | Facility: CLINIC | Age: 64
End: 2024-12-03
Payer: COMMERCIAL

## 2024-12-03 DIAGNOSIS — I10 BENIGN ESSENTIAL HYPERTENSION: Chronic | ICD-10-CM

## 2024-12-03 DIAGNOSIS — Z79.4 TYPE 2 DIABETES MELLITUS WITH BOTH EYES AFFECTED BY MILD NONPROLIFERATIVE RETINOPATHY WITHOUT MACULAR EDEMA, WITH LONG-TERM CURRENT USE OF INSULIN: ICD-10-CM

## 2024-12-03 DIAGNOSIS — H35.50: ICD-10-CM

## 2024-12-03 DIAGNOSIS — E78.2 MIXED HYPERLIPIDEMIA: ICD-10-CM

## 2024-12-03 DIAGNOSIS — I20.89 STABLE ANGINA PECTORIS (CMS-HCC): ICD-10-CM

## 2024-12-03 DIAGNOSIS — H40.003 GLAUCOMA SUSPECT OF BOTH EYES: Primary | ICD-10-CM

## 2024-12-03 DIAGNOSIS — I50.22 CHRONIC SYSTOLIC CONGESTIVE HEART FAILURE: ICD-10-CM

## 2024-12-03 DIAGNOSIS — R07.89 ATYPICAL CHEST PAIN: ICD-10-CM

## 2024-12-03 DIAGNOSIS — I10 BENIGN ESSENTIAL HYPERTENSION: ICD-10-CM

## 2024-12-03 DIAGNOSIS — I50.40 COMBINED SYSTOLIC AND DIASTOLIC CONGESTIVE HEART FAILURE, UNSPECIFIED HF CHRONICITY: ICD-10-CM

## 2024-12-03 DIAGNOSIS — I51.3 LV (LEFT VENTRICULAR) MURAL THROMBUS: ICD-10-CM

## 2024-12-03 DIAGNOSIS — E11.3293 TYPE 2 DIABETES MELLITUS WITH BOTH EYES AFFECTED BY MILD NONPROLIFERATIVE RETINOPATHY WITHOUT MACULAR EDEMA, WITH LONG-TERM CURRENT USE OF INSULIN: ICD-10-CM

## 2024-12-03 DIAGNOSIS — I25.10 ARTERIOSCLEROTIC CARDIOVASCULAR DISEASE: ICD-10-CM

## 2024-12-03 DIAGNOSIS — H25.9 AGE-RELATED CATARACT OF BOTH EYES, UNSPECIFIED AGE-RELATED CATARACT TYPE: ICD-10-CM

## 2024-12-03 PROCEDURE — 92134 CPTRZ OPH DX IMG PST SGM RTA: CPT | Performed by: OPHTHALMOLOGY

## 2024-12-03 PROCEDURE — RXMED WILLOW AMBULATORY MEDICATION CHARGE

## 2024-12-03 PROCEDURE — 92136 OPHTHALMIC BIOMETRY: CPT | Performed by: OPHTHALMOLOGY

## 2024-12-03 PROCEDURE — 92136 OPHTHALMIC BIOMETRY: CPT | Mod: BILATERAL PROCEDURE | Performed by: OPHTHALMOLOGY

## 2024-12-03 PROCEDURE — 99203 OFFICE O/P NEW LOW 30 MIN: CPT | Performed by: OPHTHALMOLOGY

## 2024-12-03 RX ORDER — PRAVASTATIN SODIUM 80 MG/1
80 TABLET ORAL DAILY
Qty: 90 TABLET | Refills: 3 | Status: SHIPPED | OUTPATIENT
Start: 2024-12-03 | End: 2025-12-03

## 2024-12-03 RX ORDER — FUROSEMIDE 40 MG/1
40 TABLET ORAL DAILY
Qty: 90 TABLET | Refills: 3 | Status: SHIPPED | OUTPATIENT
Start: 2024-12-03 | End: 2025-12-03

## 2024-12-03 RX ORDER — AMLODIPINE BESYLATE 10 MG/1
10 TABLET ORAL DAILY
Qty: 90 TABLET | Refills: 3 | Status: SHIPPED | OUTPATIENT
Start: 2024-12-03 | End: 2025-12-03

## 2024-12-03 RX ORDER — ISOSORBIDE MONONITRATE 30 MG/1
30 TABLET, EXTENDED RELEASE ORAL DAILY
Qty: 90 TABLET | Refills: 3 | Status: SHIPPED | OUTPATIENT
Start: 2024-12-03 | End: 2025-12-03

## 2024-12-03 RX ORDER — NITROGLYCERIN 0.4 MG/1
0.4 TABLET SUBLINGUAL EVERY 5 MIN PRN
Qty: 90 TABLET | Refills: 12 | Status: SHIPPED | OUTPATIENT
Start: 2024-12-03

## 2024-12-03 RX ORDER — ASPIRIN 81 MG/1
81 TABLET ORAL DAILY
Qty: 90 TABLET | Refills: 3 | Status: SHIPPED | OUTPATIENT
Start: 2024-12-03 | End: 2025-12-03

## 2024-12-03 RX ORDER — METOPROLOL SUCCINATE 50 MG/1
50 TABLET, EXTENDED RELEASE ORAL DAILY
Qty: 90 TABLET | Refills: 3 | Status: SHIPPED | OUTPATIENT
Start: 2024-12-03 | End: 2025-12-03

## 2024-12-03 RX ORDER — SACUBITRIL AND VALSARTAN 49; 51 MG/1; MG/1
1 TABLET, FILM COATED ORAL 2 TIMES DAILY
Qty: 180 TABLET | Refills: 3 | Status: SHIPPED | OUTPATIENT
Start: 2024-12-03 | End: 2025-12-03

## 2024-12-03 ASSESSMENT — CONF VISUAL FIELD
OD_INFERIOR_TEMPORAL_RESTRICTION: 0
OD_INFERIOR_NASAL_RESTRICTION: 0
OS_SUPERIOR_NASAL_RESTRICTION: 0
OD_SUPERIOR_TEMPORAL_RESTRICTION: 0
OS_INFERIOR_TEMPORAL_RESTRICTION: 0
OD_NORMAL: 1
OS_INFERIOR_NASAL_RESTRICTION: 0
OS_NORMAL: 1
METHOD: COUNTING FINGERS
OS_SUPERIOR_TEMPORAL_RESTRICTION: 0
OD_SUPERIOR_NASAL_RESTRICTION: 0

## 2024-12-03 ASSESSMENT — ENCOUNTER SYMPTOMS
ENDOCRINE NEGATIVE: 0
NEUROLOGICAL NEGATIVE: 0
CONSTITUTIONAL NEGATIVE: 0
MUSCULOSKELETAL NEGATIVE: 0
GASTROINTESTINAL NEGATIVE: 0
HEMATOLOGIC/LYMPHATIC NEGATIVE: 0
RESPIRATORY NEGATIVE: 0
PSYCHIATRIC NEGATIVE: 0
EYES NEGATIVE: 0
CARDIOVASCULAR NEGATIVE: 0
ALLERGIC/IMMUNOLOGIC NEGATIVE: 0

## 2024-12-03 ASSESSMENT — TONOMETRY
IOP_METHOD: GOLDMANN APPLANATION
OD_IOP_MMHG: 18
OS_IOP_MMHG: 18

## 2024-12-03 ASSESSMENT — EXTERNAL EXAM - RIGHT EYE: OD_EXAM: NORMAL

## 2024-12-03 ASSESSMENT — SLIT LAMP EXAM - LIDS
COMMENTS: GOOD POSITION
COMMENTS: GOOD POSITION

## 2024-12-03 ASSESSMENT — CUP TO DISC RATIO
OD_RATIO: .7
OS_RATIO: .7

## 2024-12-03 ASSESSMENT — REFRACTION_MANIFEST
OS_CYLINDER: -0.75
OS_AXIS: 040
OD_SPHERE: +1.25
OD_ADD: +2.50
OS_SPHERE: +1.50
OD_CYLINDER: -0.75
OS_ADD: +2.50
OD_AXIS: 135

## 2024-12-03 ASSESSMENT — EXTERNAL EXAM - LEFT EYE: OS_EXAM: NORMAL

## 2024-12-03 ASSESSMENT — PACHYMETRY
OS_CT(UM): 541
OD_CT(UM): 537

## 2024-12-03 ASSESSMENT — VISUAL ACUITY
OD_SC: 20/400
OS_SC: 20/500
OD_PH_SC: 20/200
METHOD: SNELLEN - LINEAR
OS_PH_SC: 20/200

## 2024-12-03 NOTE — PROGRESS NOTES
Assessment/Plan   Diagnoses and all orders for this visit:  Glaucoma suspect of both eyes  -normal IOP and stable OCT off drops, observe    Benign essential hypertension  -     Referral to Ophthalmology  Type 2 diabetes mellitus with both eyes affected by mild nonproliferative retinopathy without macular edema, with long-term current use of insulin  -     Referral to Ophthalmology  no retinopathy observed on exam today od/os, pt ed to continue good BGlc, blood pressure and lipid control, rtc with any changes in vision,   Age-related cataract of both eyes, unspecified age-related cataract type  -     OCT, Retina - OU - Both Eyes  -     IOL Biometry - OU - Both Eyes    Bull's eye macular dystrophy  Retina eval 6 mo, fu cataracts 1 y  Glasses prescription given to patient today   [facility]

## 2024-12-04 ENCOUNTER — PHARMACY VISIT (OUTPATIENT)
Dept: PHARMACY | Facility: CLINIC | Age: 64
End: 2024-12-04
Payer: MEDICAID

## 2024-12-05 ENCOUNTER — PHARMACY VISIT (OUTPATIENT)
Dept: PHARMACY | Facility: CLINIC | Age: 64
End: 2024-12-05
Payer: MEDICAID

## 2024-12-07 PROCEDURE — RXMED WILLOW AMBULATORY MEDICATION CHARGE

## 2024-12-10 ENCOUNTER — PHARMACY VISIT (OUTPATIENT)
Dept: PHARMACY | Facility: CLINIC | Age: 64
End: 2024-12-10
Payer: MEDICAID

## 2024-12-14 DIAGNOSIS — J45.20 MILD INTERMITTENT ASTHMA, UNSPECIFIED WHETHER COMPLICATED (HHS-HCC): ICD-10-CM

## 2024-12-14 PROCEDURE — RXMED WILLOW AMBULATORY MEDICATION CHARGE

## 2024-12-15 RX ORDER — ALBUTEROL SULFATE 90 UG/1
1-2 INHALANT RESPIRATORY (INHALATION) AS NEEDED
Qty: 18 G | Refills: 3 | Status: SHIPPED | OUTPATIENT
Start: 2024-12-15

## 2024-12-16 PROCEDURE — RXMED WILLOW AMBULATORY MEDICATION CHARGE

## 2024-12-18 ENCOUNTER — PHARMACY VISIT (OUTPATIENT)
Dept: PHARMACY | Facility: CLINIC | Age: 64
End: 2024-12-18
Payer: MEDICAID

## 2024-12-19 PROCEDURE — RXMED WILLOW AMBULATORY MEDICATION CHARGE

## 2024-12-24 ENCOUNTER — PHARMACY VISIT (OUTPATIENT)
Dept: PHARMACY | Facility: CLINIC | Age: 64
End: 2024-12-24
Payer: MEDICAID

## 2025-01-10 PROCEDURE — RXMED WILLOW AMBULATORY MEDICATION CHARGE

## 2025-01-11 PROCEDURE — RXMED WILLOW AMBULATORY MEDICATION CHARGE

## 2025-01-15 ENCOUNTER — PHARMACY VISIT (OUTPATIENT)
Dept: PHARMACY | Facility: CLINIC | Age: 65
End: 2025-01-15
Payer: MEDICAID

## 2025-01-20 PROCEDURE — RXMED WILLOW AMBULATORY MEDICATION CHARGE

## 2025-01-21 ENCOUNTER — APPOINTMENT (OUTPATIENT)
Dept: UROLOGY | Facility: CLINIC | Age: 65
End: 2025-01-21
Payer: COMMERCIAL

## 2025-01-23 PROCEDURE — RXMED WILLOW AMBULATORY MEDICATION CHARGE

## 2025-01-25 ENCOUNTER — PHARMACY VISIT (OUTPATIENT)
Dept: PHARMACY | Facility: CLINIC | Age: 65
End: 2025-01-25
Payer: MEDICAID

## 2025-01-28 PROCEDURE — RXMED WILLOW AMBULATORY MEDICATION CHARGE

## 2025-01-31 PROCEDURE — RXMED WILLOW AMBULATORY MEDICATION CHARGE

## 2025-02-03 ENCOUNTER — PHARMACY VISIT (OUTPATIENT)
Dept: PHARMACY | Facility: CLINIC | Age: 65
End: 2025-02-03
Payer: MEDICAID

## 2025-02-17 PROCEDURE — RXMED WILLOW AMBULATORY MEDICATION CHARGE

## 2025-02-19 ENCOUNTER — PHARMACY VISIT (OUTPATIENT)
Dept: PHARMACY | Facility: CLINIC | Age: 65
End: 2025-02-19
Payer: MEDICAID

## 2025-02-22 PROCEDURE — RXMED WILLOW AMBULATORY MEDICATION CHARGE

## 2025-02-24 ENCOUNTER — TELEPHONE (OUTPATIENT)
Dept: RHEUMATOLOGY | Facility: CLINIC | Age: 65
End: 2025-02-24
Payer: COMMERCIAL

## 2025-02-24 NOTE — TELEPHONE ENCOUNTER
Called pt let him know you never received the paper work from [employer]. I left a message with our fax number and asked the pt to reach out to [employer] and verify the fax number they are sending paper work to

## 2025-02-26 ENCOUNTER — PHARMACY VISIT (OUTPATIENT)
Dept: PHARMACY | Facility: CLINIC | Age: 65
End: 2025-02-26
Payer: MEDICAID

## 2025-02-27 ENCOUNTER — APPOINTMENT (OUTPATIENT)
Dept: CARDIOLOGY | Facility: CLINIC | Age: 65
End: 2025-02-27
Payer: COMMERCIAL

## 2025-03-04 ENCOUNTER — TELEPHONE (OUTPATIENT)
Dept: PRIMARY CARE | Facility: CLINIC | Age: 65
End: 2025-03-04
Payer: COMMERCIAL

## 2025-03-12 PROCEDURE — RXMED WILLOW AMBULATORY MEDICATION CHARGE

## 2025-03-14 PROCEDURE — RXMED WILLOW AMBULATORY MEDICATION CHARGE

## 2025-03-15 PROCEDURE — RXMED WILLOW AMBULATORY MEDICATION CHARGE

## 2025-03-17 ENCOUNTER — PHARMACY VISIT (OUTPATIENT)
Dept: PHARMACY | Facility: CLINIC | Age: 65
End: 2025-03-17
Payer: MEDICAID

## 2025-03-20 ENCOUNTER — PHARMACY VISIT (OUTPATIENT)
Dept: PHARMACY | Facility: CLINIC | Age: 65
End: 2025-03-20
Payer: MEDICAID

## 2025-03-24 DIAGNOSIS — E11.40 TYPE 2 DIABETES MELLITUS WITH DIABETIC NEUROPATHY, WITH LONG-TERM CURRENT USE OF INSULIN: Primary | ICD-10-CM

## 2025-03-24 DIAGNOSIS — Z79.4 TYPE 2 DIABETES MELLITUS WITH DIABETIC NEUROPATHY, WITH LONG-TERM CURRENT USE OF INSULIN: Primary | ICD-10-CM

## 2025-04-17 PROCEDURE — RXMED WILLOW AMBULATORY MEDICATION CHARGE

## 2025-04-18 PROCEDURE — RXMED WILLOW AMBULATORY MEDICATION CHARGE

## 2025-04-18 RX ORDER — GABAPENTIN 300 MG/1
300 CAPSULE ORAL 3 TIMES DAILY
Qty: 90 CAPSULE | Refills: 1 | Status: SHIPPED | OUTPATIENT
Start: 2025-04-18 | End: 2025-10-15

## 2025-04-22 ENCOUNTER — PHARMACY VISIT (OUTPATIENT)
Dept: PHARMACY | Facility: CLINIC | Age: 65
End: 2025-04-22
Payer: MEDICAID

## 2025-05-19 PROCEDURE — RXMED WILLOW AMBULATORY MEDICATION CHARGE

## 2025-05-21 ENCOUNTER — PHARMACY VISIT (OUTPATIENT)
Dept: PHARMACY | Facility: CLINIC | Age: 65
End: 2025-05-21
Payer: MEDICAID

## 2025-05-22 DIAGNOSIS — J45.20 MILD INTERMITTENT ASTHMA, UNSPECIFIED WHETHER COMPLICATED (HHS-HCC): ICD-10-CM

## 2025-05-22 PROCEDURE — RXMED WILLOW AMBULATORY MEDICATION CHARGE

## 2025-05-22 RX ORDER — ALBUTEROL SULFATE 90 UG/1
1-2 INHALANT RESPIRATORY (INHALATION) AS NEEDED
Qty: 18 G | Refills: 3 | Status: SHIPPED | OUTPATIENT
Start: 2025-05-22

## 2025-05-27 ENCOUNTER — PHARMACY VISIT (OUTPATIENT)
Dept: PHARMACY | Facility: CLINIC | Age: 65
End: 2025-05-27
Payer: MEDICAID

## 2025-05-27 PROCEDURE — RXMED WILLOW AMBULATORY MEDICATION CHARGE

## 2025-05-28 ENCOUNTER — PHARMACY VISIT (OUTPATIENT)
Dept: PHARMACY | Facility: CLINIC | Age: 65
End: 2025-05-28
Payer: MEDICAID

## 2025-06-05 ENCOUNTER — APPOINTMENT (OUTPATIENT)
Dept: OPHTHALMOLOGY | Facility: CLINIC | Age: 65
End: 2025-06-05
Payer: COMMERCIAL

## 2025-06-19 PROCEDURE — RXMED WILLOW AMBULATORY MEDICATION CHARGE

## 2025-06-24 ENCOUNTER — PHARMACY VISIT (OUTPATIENT)
Dept: PHARMACY | Facility: CLINIC | Age: 65
End: 2025-06-24
Payer: MEDICAID

## 2025-07-21 DIAGNOSIS — N40.0 BENIGN PROSTATIC HYPERPLASIA, UNSPECIFIED WHETHER LOWER URINARY TRACT SYMPTOMS PRESENT: ICD-10-CM

## 2025-07-22 PROCEDURE — RXMED WILLOW AMBULATORY MEDICATION CHARGE

## 2025-07-22 RX ORDER — SOLIFENACIN SUCCINATE 10 MG/1
10 TABLET, FILM COATED ORAL DAILY
Qty: 90 TABLET | Refills: 3 | OUTPATIENT
Start: 2025-07-22 | End: 2026-07-22

## 2025-07-25 ENCOUNTER — PHARMACY VISIT (OUTPATIENT)
Dept: PHARMACY | Facility: CLINIC | Age: 65
End: 2025-07-25
Payer: MEDICAID

## 2025-08-13 ENCOUNTER — APPOINTMENT (OUTPATIENT)
Dept: UROLOGY | Facility: CLINIC | Age: 65
End: 2025-08-13
Payer: COMMERCIAL

## 2025-08-19 DIAGNOSIS — K59.09 OTHER CONSTIPATION: ICD-10-CM

## 2025-08-19 DIAGNOSIS — N40.0 BENIGN PROSTATIC HYPERPLASIA, UNSPECIFIED WHETHER LOWER URINARY TRACT SYMPTOMS PRESENT: ICD-10-CM

## 2025-08-19 PROCEDURE — RXMED WILLOW AMBULATORY MEDICATION CHARGE

## 2025-08-19 RX ORDER — SOLIFENACIN SUCCINATE 10 MG/1
10 TABLET, FILM COATED ORAL DAILY
Qty: 90 TABLET | Refills: 3 | Status: CANCELLED | OUTPATIENT
Start: 2025-08-19 | End: 2026-08-19

## 2025-08-19 RX ORDER — DOCUSATE SODIUM 100 MG/1
100 CAPSULE, LIQUID FILLED ORAL 2 TIMES DAILY PRN
Qty: 60 CAPSULE | Refills: 1 | Status: SHIPPED | OUTPATIENT
Start: 2025-08-19

## 2025-08-20 PROCEDURE — RXMED WILLOW AMBULATORY MEDICATION CHARGE

## 2025-08-21 ENCOUNTER — PHARMACY VISIT (OUTPATIENT)
Dept: PHARMACY | Facility: CLINIC | Age: 65
End: 2025-08-21
Payer: MEDICAID

## 2025-08-25 ENCOUNTER — PHARMACY VISIT (OUTPATIENT)
Dept: PHARMACY | Facility: CLINIC | Age: 65
End: 2025-08-25
Payer: MEDICAID

## 2025-08-25 DIAGNOSIS — N40.0 BENIGN PROSTATIC HYPERPLASIA, UNSPECIFIED WHETHER LOWER URINARY TRACT SYMPTOMS PRESENT: ICD-10-CM

## 2025-08-27 PROCEDURE — RXMED WILLOW AMBULATORY MEDICATION CHARGE

## 2025-08-27 RX ORDER — SOLIFENACIN SUCCINATE 10 MG/1
10 TABLET, FILM COATED ORAL DAILY
Qty: 30 TABLET | Refills: 0 | Status: SHIPPED | OUTPATIENT
Start: 2025-08-27 | End: 2025-09-28

## 2025-08-29 ENCOUNTER — PHARMACY VISIT (OUTPATIENT)
Dept: PHARMACY | Facility: CLINIC | Age: 65
End: 2025-08-29
Payer: MEDICAID

## 2025-09-04 ENCOUNTER — APPOINTMENT (OUTPATIENT)
Dept: CARDIOLOGY | Facility: CLINIC | Age: 65
End: 2025-09-04
Payer: COMMERCIAL

## 2025-09-04 PROBLEM — Z13.89 SCREENING FOR OBESITY: Status: RESOLVED | Noted: 2023-03-24 | Resolved: 2025-09-04

## 2025-09-04 PROBLEM — E66.813 CLASS 3 SEVERE OBESITY DUE TO EXCESS CALORIES WITH SERIOUS COMORBIDITY AND BODY MASS INDEX (BMI) OF 50.0 TO 59.9 IN ADULT: Status: RESOLVED | Noted: 2023-07-20 | Resolved: 2025-09-04

## 2025-09-04 ASSESSMENT — ENCOUNTER SYMPTOMS: DEPRESSION: 0

## 2025-09-16 ENCOUNTER — APPOINTMENT (OUTPATIENT)
Dept: UROLOGY | Facility: CLINIC | Age: 65
End: 2025-09-16
Payer: COMMERCIAL

## 2025-09-19 ENCOUNTER — APPOINTMENT (OUTPATIENT)
Dept: PRIMARY CARE | Facility: CLINIC | Age: 65
End: 2025-09-19
Payer: COMMERCIAL

## 2025-12-05 ENCOUNTER — APPOINTMENT (OUTPATIENT)
Dept: OPHTHALMOLOGY | Facility: CLINIC | Age: 65
End: 2025-12-05
Payer: COMMERCIAL